# Patient Record
Sex: FEMALE | Race: WHITE | NOT HISPANIC OR LATINO | Employment: FULL TIME | URBAN - METROPOLITAN AREA
[De-identification: names, ages, dates, MRNs, and addresses within clinical notes are randomized per-mention and may not be internally consistent; named-entity substitution may affect disease eponyms.]

---

## 2019-01-21 ENCOUNTER — OFFICE VISIT (OUTPATIENT)
Dept: URGENT CARE | Facility: CLINIC | Age: 24
End: 2019-01-21

## 2019-01-21 VITALS
DIASTOLIC BLOOD PRESSURE: 67 MMHG | TEMPERATURE: 103 F | SYSTOLIC BLOOD PRESSURE: 116 MMHG | WEIGHT: 110 LBS | HEIGHT: 60 IN | HEART RATE: 126 BPM | BODY MASS INDEX: 21.6 KG/M2 | OXYGEN SATURATION: 99 %

## 2019-01-21 DIAGNOSIS — R50.9 FEVER, UNSPECIFIED FEVER CAUSE: ICD-10-CM

## 2019-01-21 DIAGNOSIS — J02.9 ACUTE PHARYNGITIS, UNSPECIFIED ETIOLOGY: Primary | ICD-10-CM

## 2019-01-21 LAB
CTP QC/QA: YES
S PYO RRNA THROAT QL PROBE: NEGATIVE

## 2019-01-21 PROCEDURE — 96372 THER/PROPH/DIAG INJ SC/IM: CPT | Mod: S$GLB,,, | Performed by: PHYSICIAN ASSISTANT

## 2019-01-21 PROCEDURE — 96372 PR INJECTION,THERAP/PROPH/DIAG2ST, IM OR SUBCUT: ICD-10-PCS | Mod: S$GLB,,, | Performed by: PHYSICIAN ASSISTANT

## 2019-01-21 PROCEDURE — 87880 STREP A ASSAY W/OPTIC: CPT | Mod: QW,S$GLB,, | Performed by: PHYSICIAN ASSISTANT

## 2019-01-21 PROCEDURE — 99204 OFFICE O/P NEW MOD 45 MIN: CPT | Mod: 25,S$GLB,, | Performed by: PHYSICIAN ASSISTANT

## 2019-01-21 PROCEDURE — 99204 PR OFFICE/OUTPT VISIT, NEW, LEVL IV, 45-59 MIN: ICD-10-PCS | Mod: 25,S$GLB,, | Performed by: PHYSICIAN ASSISTANT

## 2019-01-21 PROCEDURE — 87880 POCT RAPID STREP A: ICD-10-PCS | Mod: QW,S$GLB,, | Performed by: PHYSICIAN ASSISTANT

## 2019-01-21 RX ORDER — DEXAMETHASONE SODIUM PHOSPHATE 100 MG/10ML
8 INJECTION INTRAMUSCULAR; INTRAVENOUS
Status: COMPLETED | OUTPATIENT
Start: 2019-01-21 | End: 2019-01-21

## 2019-01-21 RX ORDER — AMOXICILLIN AND CLAVULANATE POTASSIUM 875; 125 MG/1; MG/1
1 TABLET, FILM COATED ORAL 2 TIMES DAILY
Qty: 20 TABLET | Refills: 0 | Status: SHIPPED | OUTPATIENT
Start: 2019-01-21 | End: 2019-01-31

## 2019-01-21 RX ORDER — TRIPROLIDINE/PSEUDOEPHEDRINE 2.5MG-60MG
600 TABLET ORAL
Status: COMPLETED | OUTPATIENT
Start: 2019-01-21 | End: 2019-01-21

## 2019-01-21 RX ADMIN — Medication 600 MG: at 01:01

## 2019-01-21 RX ADMIN — DEXAMETHASONE SODIUM PHOSPHATE 8 MG: 100 INJECTION INTRAMUSCULAR; INTRAVENOUS at 01:01

## 2019-01-21 NOTE — PROGRESS NOTES
Subjective:       Patient ID: Maury Smith is a 23 y.o. female.    Vitals:  height is 5' (1.524 m) and weight is 49.9 kg (110 lb). Her oral temperature is 102.6 °F (39.2 °C) (abnormal). Her blood pressure is 116/67 and her pulse is 126 (abnormal). Her oxygen saturation is 99%.     Chief Complaint: Sinus Problem    Sinus Problem   This is a new problem. The current episode started in the past 7 days (Saturday). The problem has been gradually worsening since onset. The maximum temperature recorded prior to her arrival was 102 - 102.9 F. The fever has been present for 1 to 2 days. Her pain is at a severity of 8/10. The pain is moderate. Associated symptoms include ear pain, a sore throat and swollen glands. Pertinent negatives include no chills, congestion, coughing, diaphoresis, headaches, neck pain, shortness of breath, sinus pressure or sneezing. Past treatments include nothing.       Constitution: Negative for chills, sweating, fatigue and fever.   HENT: Positive for ear pain and sore throat. Negative for congestion, sinus pain, sinus pressure and voice change.    Neck: Negative for neck pain and painful lymph nodes.   Eyes: Negative for eye redness.   Respiratory: Negative for chest tightness, cough, sputum production, bloody sputum, COPD, shortness of breath, stridor, wheezing and asthma.    Gastrointestinal: Negative for nausea and vomiting.   Musculoskeletal: Negative for muscle ache.   Skin: Negative for rash.   Allergic/Immunologic: Negative for seasonal allergies, asthma and sneezing.   Neurological: Negative for headaches.   Hematologic/Lymphatic: Negative for swollen lymph nodes.       Objective:      Physical Exam   Constitutional: She is oriented to person, place, and time. She appears well-developed and well-nourished. She is cooperative.  Non-toxic appearance. She does not have a sickly appearance. She does not appear ill. No distress.   HENT:   Head: Normocephalic and atraumatic.   Right Ear:  Hearing, tympanic membrane, external ear and ear canal normal.   Left Ear: Hearing, tympanic membrane, external ear and ear canal normal.   Nose: Nose normal. No mucosal edema, rhinorrhea or nasal deformity. No epistaxis. Right sinus exhibits no maxillary sinus tenderness and no frontal sinus tenderness. Left sinus exhibits no maxillary sinus tenderness and no frontal sinus tenderness.   Mouth/Throat: Uvula is midline and mucous membranes are normal. No trismus in the jaw. Normal dentition. No uvula swelling. Posterior oropharyngeal edema (mild) and posterior oropharyngeal erythema present. No oropharyngeal exudate. Tonsils are 1+ on the right. Tonsils are 1+ on the left. Tonsillar exudate.   Eyes: Conjunctivae, EOM and lids are normal. Pupils are equal, round, and reactive to light. No scleral icterus.   Sclera clear bilat   Neck: Trachea normal, full passive range of motion without pain and phonation normal. Neck supple. No neck rigidity. No edema and no erythema present.   Cardiovascular: Normal rate, regular rhythm, normal heart sounds, intact distal pulses and normal pulses.   Pulmonary/Chest: Effort normal and breath sounds normal. No respiratory distress. She has no decreased breath sounds. She has no wheezes. She has no rhonchi. She has no rales.   Abdominal: Soft. Normal appearance and bowel sounds are normal. She exhibits no distension. There is no tenderness.   Musculoskeletal: Normal range of motion. She exhibits no edema or deformity.   Lymphadenopathy:     She has no cervical adenopathy.   Neurological: She is alert and oriented to person, place, and time. She exhibits normal muscle tone. Coordination normal.   Skin: Skin is warm, dry and intact. She is not diaphoretic. No pallor.   Psychiatric: She has a normal mood and affect. Her speech is normal and behavior is normal. Judgment and thought content normal. Cognition and memory are normal.   Nursing note and vitals reviewed.      Assessment:       1.  Acute pharyngitis, unspecified etiology    2. Fever, unspecified fever cause        Plan:       - Pt declined respiratory culture for cost reasons. Will treat empirically based on history and physical exam findings. Warning signs and symptoms discussed that might warrant an ED visit. Pt v/u all education and instruction. Discharged in stable condition.    Acute pharyngitis, unspecified etiology  -     ibuprofen 100 mg/5 mL suspension 600 mg  -     dexamethasone injection 8 mg  -     amoxicillin-clavulanate 875-125mg (AUGMENTIN) 875-125 mg per tablet; Take 1 tablet by mouth 2 (two) times daily. for 10 days  Dispense: 20 tablet; Refill: 0    Fever, unspecified fever cause  -     Cancel: POCT Influenza A/B  -     POCT rapid strep A      Patient Instructions   · Follow up with your primary care in 2-5 days if symptoms have not improved, or you may return here.  · If you were referred to a specialist, please follow up with that specialty.  · If you were prescribed antibiotics, please take them to completion.  · If you were prescribed a narcotic or any medication with sedative effects, do not drive or operate heavy equipment or machinery while taking these medications.  · You must understand that you have received treatment at an Urgent Care facility only, and that you may be released before all of your medical problems are known or treated. Urgent Care facilities are not equipped to handle life threatening emergencies. It is recommended that you go to an Emergency Department for further evaluation of worsening or concerning symptoms, or possibly life threatening conditions as discussed.                                        If you  smoke, please stop smoking      Symptomatic treatment:    Alternate tylenol and motrin every 4-6 hours  salt water gargles  Cold-eeze helps to reduce the duration of sore throat symptoms  Cepachol helps to numb the discomfort  Chloroseptic spray  Nasal saline spray reduces inflammation and  dryness  Warm face compresses as often as you can  Vicks vapor rub at night  Flonase OTC or Nasacort OTC  Simple foods like chicken noodle soup help  Pedialyte helps with dehydration if lacking appetite  Rest as much as you can

## 2019-10-28 PROBLEM — F41.9 ANXIETY: Status: ACTIVE | Noted: 2019-10-28

## 2019-10-28 PROBLEM — E28.2 PCOS (POLYCYSTIC OVARIAN SYNDROME): Status: ACTIVE | Noted: 2019-10-28

## 2019-11-11 PROBLEM — R31.29 MICROSCOPIC HEMATURIA: Status: ACTIVE | Noted: 2019-11-11

## 2019-11-11 PROBLEM — R10.2 PERINEAL NEURALGIA: Status: ACTIVE | Noted: 2019-11-11

## 2019-11-12 PROBLEM — R10.2 PELVIC PAIN IN FEMALE: Status: ACTIVE | Noted: 2019-11-12

## 2020-11-19 PROBLEM — S00.83XA FACIAL CONTUSION, INITIAL ENCOUNTER: Status: ACTIVE | Noted: 2020-11-19

## 2020-11-19 PROBLEM — S00.33XA CONTUSION OF NOSE: Status: ACTIVE | Noted: 2020-11-19

## 2020-11-19 PROBLEM — F10.920 ALCOHOLIC INTOXICATION WITHOUT COMPLICATION: Status: ACTIVE | Noted: 2020-11-19

## 2020-11-19 PROBLEM — S01.81XA FACIAL LACERATION: Status: ACTIVE | Noted: 2020-11-19
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
## 2020-12-03 PROBLEM — G44.319 ACUTE POST-TRAUMATIC HEADACHE, NOT INTRACTABLE: Status: ACTIVE | Noted: 2020-12-03

## 2020-12-03 PROBLEM — F43.0 STRESS REACTION: Status: ACTIVE | Noted: 2020-12-03

## 2020-12-03 PROBLEM — R07.9 CHEST PAIN: Status: ACTIVE | Noted: 2020-12-03

## 2020-12-12 ENCOUNTER — OFFICE VISIT (OUTPATIENT)
Dept: URGENT CARE | Facility: CLINIC | Age: 25
End: 2020-12-12

## 2020-12-12 VITALS
DIASTOLIC BLOOD PRESSURE: 87 MMHG | OXYGEN SATURATION: 99 % | RESPIRATION RATE: 18 BRPM | WEIGHT: 134 LBS | BODY MASS INDEX: 26.17 KG/M2 | SYSTOLIC BLOOD PRESSURE: 123 MMHG | TEMPERATURE: 99 F | HEART RATE: 109 BPM

## 2020-12-12 DIAGNOSIS — U07.1 COVID-19 VIRUS DETECTED: ICD-10-CM

## 2020-12-12 DIAGNOSIS — U07.1 COVID-19 VIRUS INFECTION: ICD-10-CM

## 2020-12-12 DIAGNOSIS — Z11.59 SCREENING FOR VIRAL DISEASE: Primary | ICD-10-CM

## 2020-12-12 LAB
CTP QC/QA: YES
SARS-COV-2 RDRP RESP QL NAA+PROBE: POSITIVE

## 2020-12-12 PROCEDURE — U0002: ICD-10-PCS | Mod: QW,TIER,S$GLB, | Performed by: FAMILY MEDICINE

## 2020-12-12 PROCEDURE — U0002 COVID-19 LAB TEST NON-CDC: HCPCS | Mod: QW,TIER,S$GLB, | Performed by: FAMILY MEDICINE

## 2020-12-12 PROCEDURE — 99203 OFFICE O/P NEW LOW 30 MIN: CPT | Mod: TIER,S$GLB,, | Performed by: FAMILY MEDICINE

## 2020-12-12 PROCEDURE — 99203 PR OFFICE/OUTPT VISIT, NEW, LEVL III, 30-44 MIN: ICD-10-PCS | Mod: TIER,S$GLB,, | Performed by: FAMILY MEDICINE

## 2020-12-12 RX ORDER — NAPROXEN 375 MG/1
375 TABLET ORAL 2 TIMES DAILY
Qty: 20 TABLET | Refills: 0 | Status: SHIPPED | OUTPATIENT
Start: 2020-12-12 | End: 2021-02-04

## 2020-12-12 RX ORDER — DEXTROMETHORPHAN HBR, GUAIFENESIN AND PSEUDOEPHEDRINE HCL 60; 380; 20 MG/1; MG/1; MG/1
1 TABLET ORAL EVERY 6 HOURS PRN
Qty: 20 TABLET | Refills: 0 | Status: SHIPPED | OUTPATIENT
Start: 2020-12-12 | End: 2021-02-04

## 2020-12-12 NOTE — LETTER
[address] [facility] ? [place], [address] ? Phone [phone] ? Fax [phone]           Return to Work/School Status    Patient: Maury Diaz  YOB: 1995  Date: December 12, 2020      [facility] has adopted CDCs time-based return to work/school strategy for persons with confirmed or suspected COVID19. [facility] does not recommend using a test-based strategy for returning to work/school after COVID-19 infection. [facility] has reported prolonged positive test results without evidence of infectiousness. At this time, positive specimens capable of producing disease have not been isolated more than 9 days after onset of illness.   Symptomatic persons with confirmed COVID-19 or suspected COVID-19 can return to work/school after:    At least 3 days (72 hours) have passed since recovery defined as resolution of fever without the use of fever-reducing medications AND improvement in respiratory symptoms (e.g., cough, shortness of breath)    AND, at least 10 days have passed since symptoms first develop.  Asymptomatic persons with confirmed COVID-19 can return to work after:   At least 10 days have passed since the positive laboratory test and the person remains asymptomatic.  More information about the science behind the symptom-based return to work/school can be found at: https://www.cdc.gov/coronavirus/2019-ncov/community/vtxttaqg-mpbwwavpkuu-kbmcousyt.html    Sincerely,    Sterling Fragoso MD

## 2020-12-12 NOTE — PROGRESS NOTES
Subjective:       Patient ID: Maury Diaz is a 25 y.o. female.    Vitals:  weight is 60.8 kg (134 lb). Her temperature is 99.4 °F (37.4 °C). Her blood pressure is 123/87 and her pulse is 109. Her respiration is 18 and oxygen saturation is 99%.     Chief Complaint: Sore Throat    Sore Throat   This is a new problem. The current episode started today. The problem has been unchanged. The maximum temperature recorded prior to her arrival was 101 - 101.9 F. Associated symptoms include headaches and trouble swallowing. Pertinent negatives include no congestion, coughing, diarrhea, ear pain, neck pain, shortness of breath or vomiting. Treatments tried: motrin. The treatment provided mild relief.       Constitution: Positive for fever. Negative for chills, sweating and fatigue.   HENT: Positive for sore throat and trouble swallowing. Negative for ear pain, congestion, sinus pain, sinus pressure and voice change.    Neck: Negative for neck pain and painful lymph nodes.   Eyes: Negative for eye redness.   Respiratory: Negative for chest tightness, cough, sputum production, shortness of breath and asthma.    Gastrointestinal: Negative for nausea, vomiting and diarrhea.   Musculoskeletal: Positive for muscle ache.   Skin: Negative for rash.   Allergic/Immunologic: Negative for seasonal allergies and asthma.   Neurological: Positive for headaches.   Hematologic/Lymphatic: Negative for swollen lymph nodes.       Objective:      Physical Exam   Constitutional: She is oriented to person, place, and time. She appears well-developed. She is cooperative.  Non-toxic appearance. She does not appear ill. No distress.   HENT:   Head: Normocephalic and atraumatic.   Ears:   Right Ear: Hearing, tympanic membrane, external ear and ear canal normal.   Left Ear: Hearing, tympanic membrane, external ear and ear canal normal.   Nose: Nose normal. No mucosal edema, rhinorrhea or nasal deformity. No epistaxis. Right sinus exhibits no  maxillary sinus tenderness and no frontal sinus tenderness. Left sinus exhibits no maxillary sinus tenderness and no frontal sinus tenderness.   Mouth/Throat: Uvula is midline, oropharynx is clear and moist and mucous membranes are normal. No trismus in the jaw. Normal dentition. No uvula swelling. No oropharyngeal exudate, posterior oropharyngeal edema or posterior oropharyngeal erythema.   Eyes: Conjunctivae and lids are normal. No scleral icterus.   Neck: Trachea normal, full passive range of motion without pain and phonation normal. Neck supple. No neck rigidity. No edema and no erythema present.   Cardiovascular: Normal rate, regular rhythm, normal heart sounds and normal pulses.   Pulmonary/Chest: Effort normal and breath sounds normal. No respiratory distress. She has no decreased breath sounds. She has no rhonchi.   Abdominal: Normal appearance.   Musculoskeletal: Normal range of motion.         General: No deformity.   Neurological: She is alert and oriented to person, place, and time. She exhibits normal muscle tone. Coordination normal.   Skin: Skin is warm, dry, intact, not diaphoretic and not pale. Psychiatric: Her speech is normal and behavior is normal. Judgment and thought content normal.   Nursing note and vitals reviewed.    Results for orders placed or performed in visit on 12/12/20   POCT COVID-19 Rapid Screening   Result Value Ref Range    POC Rapid COVID Positive (A) Negative     Acceptable Yes            Assessment:       1. Screening for viral disease    2. COVID-19 virus infection        Plan:         Screening for viral disease  -     POCT COVID-19 Rapid Screening    COVID-19 virus infection  -     pseudoephedrine-DM-guaifenesin (POLY-VENT DM) 60- mg Tab; Take 1 tablet by mouth every 6 (six) hours as needed.  Dispense: 20 tablet; Refill: 0  -     naproxen (NAPROSYN) 375 MG tablet; Take 1 tablet (375 mg total) by mouth 2 (two) times daily.  Dispense: 20 tablet; Refill:  0     Please drink plenty of fluids.  Please get plenty of rest.  Please return here or go to the Emergency Department for any concerns or worsening of condition.    You have tested positive for COVID-19 today.  Please note that patients who test positive for COVID-19 are required by the CDC to undergo isolation for 10 days after their symptoms first began.  This isolation starts from the day you first developed symptoms, not the day of your positive test. For example, if your symptoms began on a Monday but tested positive on the following Wednesday, your 10-day isolation begins from that Monday, not the Wednesday you tested positive.  However, if you are asymptomatic (a person who does not have any symptoms) and COVID-19 positive, your 10-day isolation begins on the day you tested positive, regardless of exposure date.  Also, per the CDC guidelines, once your 10 days have passed, and you have not had fever greater than 100.4F in the last 24 hours without taking any fever reducers such as Tylenol (Acetaminophen) or Motrin (Ibuprofen), you may return to your normal activities including social distancing, wearing masks, and frequent handwashing - YOU DO NOT NEED ANOTHER TEST IN ORDER TO END YOUR QUARANTINE.     If you were prescribed a narcotic medication, do not drive or operate heavy equipment or machinery while taking these medications.    You were given a decongestant (RESCON or POLY VENT Dm).  If your insurance does not cover it or you cannot afford it, it is ok to use the over the counter products listed below.  If you do not have Hypertension or any history of palpitations, it is ok to take over the counter Sudafed or Mucinex D or Allegra-D or Claritin-D or Zyrtec-D.  If you do take one of the above, it is ok to combine that with plain over the counter Mucinex or Allegra or Claritin or Zyrtec.  If for example you are taking Zyrtec -D, you can combine that with Mucinex, but not Mucinex-D.  If you are taking  Mucinex-D, you can combine that with plain Allegra or Claritin or Zyrtec.   If you do have Hypertension or palpitations, it is safe to take Coricidin HBP for relief of sinus symptoms.    We recommend you take over the counter Flonase (Fluticasone) or another nasally inhaled steroid unless you are already taking one.  Nasal irrigation with a saline spray or Netti Pot like device per their directions is also recommended.  If not allergic, please take over the counter Tylenol (Acetaminophen) and/or Motrin (Ibuprofen) as directed for control of pain and/or fever.    We recommend Vitamin C (1000mg daily), Vitamin d3 (125mg daily), and Zinc (100mg daily) to help combat the Coronavirus.    Robitussin DM 2 teas every 4 hours as needed for cough.  If you  smoke, please stop smoking.    Please follow up with your primary care doctor or specialist as needed.  Nicolas Kenny MD  [phone]      You must understand that you have received treatment at an Urgent Care facility only, and that you may be  released before all of your medical problems are known or treated. Urgent Care facilities are not equipped to  handle life threatening emergencies. It is recommended that you seek care at an Emergency Department for  further evaluation of worsening or concerning symptoms, or possibly life threatening conditions as  Discussed.

## 2020-12-12 NOTE — PATIENT INSTRUCTIONS
Please drink plenty of fluids.  Please get plenty of rest.  Please return here or go to the Emergency Department for any concerns or worsening of condition.    You have tested positive for COVID-19 today.  Please note that patients who test positive for COVID-19 are required by the CDC to undergo isolation for 10 days after their symptoms first began.  This isolation starts from the day you first developed symptoms, not the day of your positive test. For example, if your symptoms began on a Monday but tested positive on the following Wednesday, your 10-day isolation begins from that Monday, not the Wednesday you tested positive.  However, if you are asymptomatic (a person who does not have any symptoms) and COVID-19 positive, your 10-day isolation begins on the day you tested positive, regardless of exposure date.  Also, per the CDC guidelines, once your 10 days have passed, and you have not had fever greater than 100.4F in the last 24 hours without taking any fever reducers such as Tylenol (Acetaminophen) or Motrin (Ibuprofen), you may return to your normal activities including social distancing, wearing masks, and frequent handwashing - YOU DO NOT NEED ANOTHER TEST IN ORDER TO END YOUR QUARANTINE.     If you were prescribed a narcotic medication, do not drive or operate heavy equipment or machinery while taking these medications.    You were given a decongestant (RESCON or POLY VENT Dm).  If your insurance does not cover it or you cannot afford it, it is ok to use the over the counter products listed below.  If you do not have Hypertension or any history of palpitations, it is ok to take over the counter Sudafed or Mucinex D or Allegra-D or Claritin-D or Zyrtec-D.  If you do take one of the above, it is ok to combine that with plain over the counter Mucinex or Allegra or Claritin or Zyrtec.  If for example you are taking Zyrtec -D, you can combine that with Mucinex, but not Mucinex-D.  If you are taking Mucinex-D,  you can combine that with plain Allegra or Claritin or Zyrtec.   If you do have Hypertension or palpitations, it is safe to take Coricidin HBP for relief of sinus symptoms.    We recommend you take over the counter Flonase (Fluticasone) or another nasally inhaled steroid unless you are already taking one.  Nasal irrigation with a saline spray or Netti Pot like device per their directions is also recommended.  If not allergic, please take over the counter Tylenol (Acetaminophen) and/or Motrin (Ibuprofen) as directed for control of pain and/or fever.    We recommend Vitamin C (1000mg daily), Vitamin d3 (125mg daily), and Zinc (100mg daily) to help combat the Coronavirus.    Robitussin DM 2 teas every 4 hours as needed for cough.  If you  smoke, please stop smoking.    Please follow up with your primary care doctor or specialist as needed.  Nicolas Kenny MD  [phone]      You must understand that you have received treatment at an Urgent Care facility only, and that you may be  released before all of your medical problems are known or treated. Urgent Care facilities are not equipped to  handle life threatening emergencies. It is recommended that you seek care at an Emergency Department for  further evaluation of worsening or concerning symptoms, or possibly life threatening conditions as  Discussed.    Instructions for Patients with Confirmed or Suspected COVID-19    If you are awaiting your test result, you will either be called or it will be released to the patient portal.  If you have any questions about your test, please visit [email or web address] or call our COVID-19 information line at [phone].      Instructions for non-hospitalized or discharged patients with confirmed or suspected COVID-19:       Stay home except to get medical care.    Separate yourself from other people and animals in your home.    Call ahead before visiting your doctor.    Wear a face mask.    Cover your coughs and  sneezes.    Clean your hands often.    Avoid sharing personal household items.    Clean all high-touch surfaces every day.    Monitor your symptoms. Seek prompt medical attention if your illness is worsening (e.g., difficulty breathing). Before seeking care, call your healthcare provider.    If you have a medical emergency and must call 911, notify the dispatcher that you have or are being evaluated for COVID-19. If possible, put on a face mask before emergency medical services arrive.    Use the following symptom-based strategy to return to normal activity following a suspected or confirmed case of COVID-19. Continue isolation until:   o At least 3 days (72 hours) have passed since recovery defined as resolution of fever without the use of fever-reducing medications and improvement in respiratory symptoms (e.g. cough, shortness of breath), and   o At least 10 days have passed since the first positive test.       As one of the next steps, you will receive a call or text from the [place] Department of Health ([facility]) COVID-19 Tracing Team. See the contact information below so you know not to ignore the health departments call. It is important that you contact them back immediately so they can help.     Contact Tracer Number:  [phone]  Caller ID for most carriers: [facility]    What is contact tracing?   Contact tracing is a process that helps identify everyone who has been in close contact with an infected person. Contact tracers let those people know they may have been exposed and guide them on next steps. Confidentiality is important for everyone; no one will be told who may have exposed them to the virus.   Your involvement is important. The more we know about where and how this virus is spreading, the better chance we have at stopping it from spreading further.  What does exposure mean?   Exposure means you have been within 6 feet for more than 15 minutes with a person who has or had  COVID-19.  What kind of questions do the contact tracers ask?   A contact tracer will confirm your basic contact information including name, address, phone number, and next of kin, as well as asking about any symptoms you may have had. Theyll also ask you how you think you may have gotten sick, such as places where you may have been exposed to the virus, and people you were with. Those names will never be shared with anyone outside of that call, and will only be used to help trace and stop the spread of the virus.   I have privacy concerns. How will the state use my information?   Your privacy about your health is important. All calls are completed using call centers that use the appropriate health privacy protection measures (HIPAA compliance), meaning that your patient information is safe. No one will ever ask you any questions related to immigration status. Your health comes first.   Do I have to participate?   You do not have to participate, but we strongly encourage you to. Contact tracing can help us catch and control new outbreaks as theyre developing to keep your friends and family safe.   What if I dont hear from anyone?   If you dont receive a call within 24 hours, you can call the number above right away to inquire about your status. That line is open from 8:00 am - 8:00 p.m., 7 days a week.  Contact tracing saves lives! Together, we have the power to beat this virus and keep our loved ones and neighbors safe.       Instructions for household members, intimate partners and caregivers in a non-healthcare setting of a patient with confirmed or suspected COVID-19:         Close contacts should monitor their health and call their healthcare provider right away if they develop symptoms suggestive of COVID-19 (e.g., fever, cough, shortness of breath).    Stay home except to get medical care. Separate yourself from other people and animals in the home.   Monitor the patients symptoms. If the patient  is getting sicker, call his or her healthcare provider. If the patient has a medical emergency and you need to call 911, notify the dispatch personnel that the patient has or is being evaluated for COVID-19.    Wear a facemask when around other people such as sharing a room or vehicle and before entering a healthcare provider's office.   Cover coughs and sneezes with a tissue. Throw used tissues in a lined trash can immediately and wash hands.   Clean hands often with soap and water for at least 20 seconds or with an alcohol-based hand , rubbing hands together until they feel dry. Avoid touching your eyes, nose, and mouth with unwashed hands.   Clean all high-touch; surfaces every day, including counters, tabletops, doorknobs, bathroom fixtures, toilets, phones, keyboards, tablets, bedside tables, etc. Use a household cleaning spray or wipe according to label instructions.   Avoid sharing personal household items such as dishes, drinking glasses, cups, towels, bedding, etc. After these items are used, they should be washed thoroughly with soap and water.   Continue isolation until:   At least 3 days (72 hours) have passed since recovery defined as resolution of fever without the use of fever-reducing medications and improvement in respiratory symptoms (e.g. cough, shortness of breath), and    At least 10 days have passed since the patients first positive test.    https://www.cdc.gov/coronavirus/2019-ncov/your-health/index.htm

## 2020-12-15 ENCOUNTER — NURSE TRIAGE (OUTPATIENT)
Dept: ADMINISTRATIVE | Facility: CLINIC | Age: 25
End: 2020-12-15

## 2020-12-15 NOTE — TELEPHONE ENCOUNTER
Day 3   Discussed cough med and mucinex and  advil and tylenol   Report any SOB, cough     Reason for Disposition   [1] COVID-19 diagnosed by HCP (doctor, NP or PA) AND [2] mild symptoms (e.g., cough, fever, others) AND [3] no complications or SOB    Additional Information   Negative: Severe difficulty breathing (e.g., struggling for each breath, speaks in single words)   Negative: Difficult to awaken or acting confused (e.g., disoriented, slurred speech)   Negative: Bluish (or gray) lips or face now   Negative: Shock suspected (e.g., cold/pale/clammy skin, too weak to stand, low BP, rapid pulse)   Negative: Sounds like a life-threatening emergency to the triager   Negative: [1] COVID-19 exposure AND [2] no symptoms   Negative: COVID-19 and Breastfeeding, questions about   Negative: [1] Adult with possible COVID-19 symptoms AND [2] triager concerned about severity of symptoms or other causes   Negative: SEVERE or constant chest pain or pressure (Exception: mild central chest pain, present only when coughing)   Negative: MODERATE difficulty breathing (e.g., speaks in phrases, SOB even at rest, pulse 100-120)   Negative: MILD difficulty breathing (e.g., minimal/no SOB at rest, SOB with walking, pulse <100)   Negative: Chest pain   Negative: Patient sounds very sick or weak to the triager   Negative: Fever > 103 F (39.4 C)   Negative: [1] Fever > 101 F (38.3 C) AND [2] age > 60   Negative: [1] Fever > 100.0 F (37.8 C) AND [2] bedridden (e.g., nursing home patient, CVA, chronic illness, recovering from surgery)   Negative: HIGH RISK patient (e.g., age > 64 years, diabetes, heart or lung disease, weak immune system) (Exception: has already been evaluated by healthcare provider and has no new or worsening symptoms)   Negative: [1] COVID-19 infection suspected by caller or triager AND [2] mild symptoms (cough, fever, or others) AND [3] no complications or SOB   Negative: Fever present > 3 days (72  hours)   Negative: [1] Fever returns after gone for over 24 hours AND [2] symptoms worse or not improved   Negative: [1] Continuous (nonstop) coughing interferes with work or school AND [2] no improvement using cough treatment per protocol   Negative: Cough present > 3 weeks    Protocols used: CORONAVIRUS (COVID-19) DIAGNOSED OR [id number]

## 2021-04-27 ENCOUNTER — HOSPITAL ENCOUNTER (EMERGENCY)
Facility: HOSPITAL | Age: 26
Discharge: HOME | End: 2021-04-28
Attending: EMERGENCY MEDICINE
Payer: COMMERCIAL
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
## 2021-04-27 LAB
B-HCG UR QL: NEGATIVE
BASOPHILS # BLD AUTO: 0.03 K/UL (ref 0–0.2)
BASOPHILS NFR BLD: 0.6 % (ref 0–1.9)
BUN SERPL-MCNC: 16 MG/DL (ref 6–30)
CHLORIDE SERPL-SCNC: 100 MMOL/L (ref 95–110)
CREAT SERPL-MCNC: 0.7 MG/DL (ref 0.5–1.4)
CTP QC/QA: YES
DIFFERENTIAL METHOD: ABNORMAL
EOSINOPHIL # BLD AUTO: 0.1 K/UL (ref 0–0.5)
EOSINOPHIL NFR BLD: 2 % (ref 0–8)
ERYTHROCYTE [DISTWIDTH] IN BLOOD BY AUTOMATED COUNT: 13.7 % (ref 11.5–14.5)
GLUCOSE SERPL-MCNC: 87 MG/DL (ref 70–110)
HCT VFR BLD AUTO: 36.5 % (ref 37–48.5)
HCT VFR BLD CALC: 40 %PCV (ref 36–54)
HGB BLD-MCNC: 12 G/DL (ref 12–16)
IMM GRANULOCYTES # BLD AUTO: 0.01 K/UL (ref 0–0.04)
IMM GRANULOCYTES NFR BLD AUTO: 0.2 % (ref 0–0.5)
LYMPHOCYTES # BLD AUTO: 1.2 K/UL (ref 1–4.8)
LYMPHOCYTES NFR BLD: 25.3 % (ref 18–48)
MCH RBC QN AUTO: 30.1 PG (ref 27–31)
MCHC RBC AUTO-ENTMCNC: 32.9 G/DL (ref 32–36)
MCV RBC AUTO: 92 FL (ref 82–98)
MONOCYTES # BLD AUTO: 0.5 K/UL (ref 0.3–1)
MONOCYTES NFR BLD: 9.2 % (ref 4–15)
NEUTROPHILS # BLD AUTO: 3.1 K/UL (ref 1.8–7.7)
NEUTROPHILS NFR BLD: 62.7 % (ref 38–73)
NRBC BLD-RTO: 0 /100 WBC
PLATELET # BLD AUTO: 256 K/UL (ref 150–450)
PMV BLD AUTO: 9.3 FL (ref 9.2–12.9)
POC IONIZED CALCIUM: 1.33 MMOL/L (ref 1.06–1.42)
POC TCO2 (MEASURED): 29 MMOL/L (ref 23–29)
POTASSIUM BLD-SCNC: 3.9 MMOL/L (ref 3.5–5.1)
RBC # BLD AUTO: 3.99 M/UL (ref 4–5.4)
SAMPLE: NORMAL
SODIUM BLD-SCNC: 139 MMOL/L (ref 136–145)
WBC # BLD AUTO: 4.9 K/UL (ref 3.9–12.7)

## 2021-04-27 PROCEDURE — 85025 COMPLETE CBC W/AUTO DIFF WBC: CPT | Performed by: STUDENT IN AN ORGANIZED HEALTH CARE EDUCATION/TRAINING PROGRAM

## 2021-04-27 PROCEDURE — 99291 CRITICAL CARE FIRST HOUR: CPT | Mod: ,,, | Performed by: EMERGENCY MEDICINE

## 2021-04-27 PROCEDURE — 99285 EMERGENCY DEPT VISIT HI MDM: CPT | Mod: 25

## 2021-04-27 PROCEDURE — 81025 URINE PREGNANCY TEST: CPT | Performed by: STUDENT IN AN ORGANIZED HEALTH CARE EDUCATION/TRAINING PROGRAM

## 2021-04-27 PROCEDURE — 80047 BASIC METABLC PNL IONIZED CA: CPT

## 2021-04-27 PROCEDURE — 96374 THER/PROPH/DIAG INJ IV PUSH: CPT

## 2021-04-27 PROCEDURE — 99291 PR CRITICAL CARE, E/M 30-74 MINUTES: ICD-10-PCS | Mod: ,,, | Performed by: EMERGENCY MEDICINE

## 2021-04-27 RX ORDER — CYCLOBENZAPRINE HCL 10 MG
10 TABLET ORAL
Status: COMPLETED | OUTPATIENT
Start: 2021-04-27 | End: 2021-04-28

## 2021-04-27 RX ORDER — ACETAMINOPHEN 500 MG
1000 TABLET ORAL
Status: COMPLETED | OUTPATIENT
Start: 2021-04-27 | End: 2021-04-28

## 2021-04-28 VITALS
RESPIRATION RATE: 33 BRPM | HEART RATE: 84 BPM | SYSTOLIC BLOOD PRESSURE: 111 MMHG | TEMPERATURE: 99 F | DIASTOLIC BLOOD PRESSURE: 71 MMHG | OXYGEN SATURATION: 100 %

## 2021-04-28 PROCEDURE — 25500020 PHARM REV CODE 255: Performed by: EMERGENCY MEDICINE

## 2021-04-28 PROCEDURE — 25000003 PHARM REV CODE 250: Performed by: STUDENT IN AN ORGANIZED HEALTH CARE EDUCATION/TRAINING PROGRAM

## 2021-04-28 PROCEDURE — 63600175 PHARM REV CODE 636 W HCPCS: Performed by: STUDENT IN AN ORGANIZED HEALTH CARE EDUCATION/TRAINING PROGRAM

## 2021-04-28 RX ORDER — DIPHENHYDRAMINE HYDROCHLORIDE 50 MG/ML
25 INJECTION INTRAMUSCULAR; INTRAVENOUS ONCE
Status: COMPLETED | OUTPATIENT
Start: 2021-04-28 | End: 2021-04-28

## 2021-04-28 RX ORDER — CYCLOBENZAPRINE HCL 10 MG
10 TABLET ORAL 3 TIMES DAILY PRN
Qty: 15 TABLET | Refills: 0 | Status: SHIPPED | OUTPATIENT
Start: 2021-04-28 | End: 2021-04-28 | Stop reason: SDUPTHER

## 2021-04-28 RX ORDER — CYCLOBENZAPRINE HCL 10 MG
10 TABLET ORAL 3 TIMES DAILY PRN
Qty: 15 TABLET | Refills: 0 | Status: SHIPPED | OUTPATIENT
Start: 2021-04-28 | End: 2021-04-29 | Stop reason: ALTCHOICE

## 2021-04-28 RX ADMIN — ACETAMINOPHEN 1000 MG: 500 TABLET, FILM COATED ORAL at 12:04

## 2021-04-28 RX ADMIN — IOHEXOL 75 ML: 350 INJECTION, SOLUTION INTRAVENOUS at 12:04

## 2021-04-28 RX ADMIN — CYCLOBENZAPRINE 10 MG: 10 TABLET, FILM COATED ORAL at 12:04

## 2021-04-28 RX ADMIN — DIPHENHYDRAMINE HYDROCHLORIDE 25 MG: 50 INJECTION, SOLUTION INTRAMUSCULAR; INTRAVENOUS at 12:04

## 2021-04-29 ENCOUNTER — TELEPHONE (OUTPATIENT)
Dept: EMERGENCY MEDICINE | Facility: HOSPITAL | Age: 26
End: 2021-04-29

## 2021-05-10 ENCOUNTER — PATIENT MESSAGE (OUTPATIENT)
Dept: RESEARCH | Facility: HOSPITAL | Age: 26
End: 2021-05-10

## 2022-02-05 ENCOUNTER — OFFICE VISIT (OUTPATIENT)
Dept: URGENT CARE | Facility: CLINIC | Age: 27
End: 2022-02-05
Payer: COMMERCIAL

## 2022-02-05 VITALS
BODY MASS INDEX: 27.48 KG/M2 | RESPIRATION RATE: 18 BRPM | HEART RATE: 81 BPM | OXYGEN SATURATION: 98 % | WEIGHT: 140 LBS | TEMPERATURE: 99 F | SYSTOLIC BLOOD PRESSURE: 123 MMHG | HEIGHT: 60 IN | DIASTOLIC BLOOD PRESSURE: 87 MMHG

## 2022-02-05 DIAGNOSIS — J30.9 ALLERGIC SHINERS: ICD-10-CM

## 2022-02-05 DIAGNOSIS — J30.89 NON-SEASONAL ALLERGIC RHINITIS, UNSPECIFIED TRIGGER: Primary | ICD-10-CM

## 2022-02-05 PROCEDURE — 3079F DIAST BP 80-89 MM HG: CPT | Mod: CPTII,S$GLB,, | Performed by: NURSE PRACTITIONER

## 2022-02-05 PROCEDURE — 3079F PR MOST RECENT DIASTOLIC BLOOD PRESSURE 80-89 MM HG: ICD-10-PCS | Mod: CPTII,S$GLB,, | Performed by: NURSE PRACTITIONER

## 2022-02-05 PROCEDURE — 99203 PR OFFICE/OUTPT VISIT, NEW, LEVL III, 30-44 MIN: ICD-10-PCS | Mod: S$GLB,,, | Performed by: NURSE PRACTITIONER

## 2022-02-05 PROCEDURE — 1159F MED LIST DOCD IN RCRD: CPT | Mod: CPTII,S$GLB,, | Performed by: NURSE PRACTITIONER

## 2022-02-05 PROCEDURE — 1160F RVW MEDS BY RX/DR IN RCRD: CPT | Mod: CPTII,S$GLB,, | Performed by: NURSE PRACTITIONER

## 2022-02-05 PROCEDURE — 3008F BODY MASS INDEX DOCD: CPT | Mod: CPTII,S$GLB,, | Performed by: NURSE PRACTITIONER

## 2022-02-05 PROCEDURE — 3074F PR MOST RECENT SYSTOLIC BLOOD PRESSURE < 130 MM HG: ICD-10-PCS | Mod: CPTII,S$GLB,, | Performed by: NURSE PRACTITIONER

## 2022-02-05 PROCEDURE — 99203 OFFICE O/P NEW LOW 30 MIN: CPT | Mod: S$GLB,,, | Performed by: NURSE PRACTITIONER

## 2022-02-05 PROCEDURE — 3008F PR BODY MASS INDEX (BMI) DOCUMENTED: ICD-10-PCS | Mod: CPTII,S$GLB,, | Performed by: NURSE PRACTITIONER

## 2022-02-05 PROCEDURE — 3074F SYST BP LT 130 MM HG: CPT | Mod: CPTII,S$GLB,, | Performed by: NURSE PRACTITIONER

## 2022-02-05 PROCEDURE — 1159F PR MEDICATION LIST DOCUMENTED IN MEDICAL RECORD: ICD-10-PCS | Mod: CPTII,S$GLB,, | Performed by: NURSE PRACTITIONER

## 2022-02-05 PROCEDURE — 1160F PR REVIEW ALL MEDS BY PRESCRIBER/CLIN PHARMACIST DOCUMENTED: ICD-10-PCS | Mod: CPTII,S$GLB,, | Performed by: NURSE PRACTITIONER

## 2022-02-05 RX ORDER — LEVOCETIRIZINE DIHYDROCHLORIDE 5 MG/1
5 TABLET, FILM COATED ORAL NIGHTLY
Qty: 30 TABLET | Refills: 0 | Status: SHIPPED | OUTPATIENT
Start: 2022-02-05 | End: 2022-05-24

## 2022-02-05 RX ORDER — IPRATROPIUM BROMIDE 21 UG/1
2 SPRAY, METERED NASAL 2 TIMES DAILY
Qty: 30 ML | Refills: 0 | Status: SHIPPED | OUTPATIENT
Start: 2022-02-05 | End: 2022-02-19

## 2022-02-05 NOTE — PROGRESS NOTES
Subjective:       Patient ID: Maury Smith is a 26 y.o. female.    Vitals:  height is 5' (1.524 m) and weight is 63.5 kg (140 lb). Her tympanic temperature is 98.5 °F (36.9 °C). Her blood pressure is 123/87 and her pulse is 81. Her respiration is 18 and oxygen saturation is 98%.     Chief Complaint: Eye Problem and Nasal Congestion (Symptoms has been on and off for 2 months)    Patient comes to the clinic today with complaint in sinus congestion, intermittent productive cough, itchy watery eyes x2 months.    She denies history of recurrent seasonal allergies prior to current onset.  She denies any recent environmental changes that may have prompted an allergic response.  She did recently move apartments but states her symptoms began prior to removed.  Patient denies any soap or detergent changes.  No new pets or other exposure that she is aware of.    Patient denies recent fever though she admits that perhaps 2 months ago she may have felt warm 1 day.    Patient has taken intermittent doses of phenylephrine with limited or no relief.  She has not taken any antihistamines or other allergy medicines.    Eye Problem   Both eyes are affected.This is a recurrent problem. The current episode started more than 1 month ago. The problem occurs intermittently. The problem has been gradually worsening. There was no injury mechanism. The pain is at a severity of 0/10. The patient is experiencing no pain. There is no known exposure to pink eye. She does not wear contacts. Associated symptoms include eye redness and itching. Pertinent negatives include no blurred vision, double vision, fever, nausea or vomiting. She has tried nothing for the symptoms. The treatment provided no relief.       Constitution: Negative for chills and fever.   HENT: Positive for congestion and postnasal drip. Negative for ear pain.    Eyes: Positive for eye itching and eye redness. Negative for vision loss, double vision, blurred vision and eyelid  swelling.   Respiratory: Positive for cough. Negative for shortness of breath.    Gastrointestinal: Negative for nausea, vomiting and diarrhea.       Objective:      Physical Exam   Constitutional: She is oriented to person, place, and time. She appears well-developed and well-nourished. She is cooperative.  Non-toxic appearance. She does not have a sickly appearance. She does not appear ill. No distress.   HENT:   Head: Normocephalic and atraumatic.   Ears:   Right Ear: Hearing, tympanic membrane, external ear and ear canal normal.   Left Ear: Hearing, tympanic membrane, external ear and ear canal normal.   Nose: Mucosal edema and rhinorrhea present. No nasal deformity. No epistaxis. Right sinus exhibits no maxillary sinus tenderness and no frontal sinus tenderness. Left sinus exhibits no maxillary sinus tenderness and no frontal sinus tenderness.   Mouth/Throat: Uvula is midline, oropharynx is clear and moist and mucous membranes are normal. No trismus in the jaw. Normal dentition. No uvula swelling. Cobblestoning present. No oropharyngeal exudate, posterior oropharyngeal edema or posterior oropharyngeal erythema.   Eyes: Conjunctivae and lids are normal. No scleral icterus.   vision grossly intact periorbital hyperpigmentation   Neck: Trachea normal and phonation normal. Neck supple. No edema present. No erythema present. No neck rigidity present.   Cardiovascular: Normal rate, regular rhythm, normal heart sounds, intact distal pulses and normal pulses.   Pulmonary/Chest: Effort normal and breath sounds normal. No stridor. No respiratory distress. She has no decreased breath sounds. She has no wheezes. She has no rhonchi. She has no rales.   Abdominal: Normal appearance.   Musculoskeletal: Normal range of motion.         General: No deformity or edema. Normal range of motion.   Neurological: She is alert and oriented to person, place, and time. She exhibits normal muscle tone. Coordination normal.   Skin: Skin is  warm, dry, intact, not diaphoretic and not pale.   Psychiatric: She has a normal mood and affect. Her speech is normal and behavior is normal. Judgment and thought content normal. Cognition and memory  Nursing note and vitals reviewed.        Assessment:       1. Non-seasonal allergic rhinitis, unspecified trigger    2. 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  Prescribed liver cetirizine in office today as it is the only oral antihistamine listed but does not have iodine as an inert ingredients.  Patient cautioned that she should take this product in the evening as it may make her drowsy.    Non-seasonal allergic rhinitis, unspecified trigger  -     ipratropium (ATROVENT) 21 mcg (0.03 %) nasal spray; 2 sprays by Nasal route 2 (two) times daily. for 14 days  Dispense: 30 mL; Refill: 0  -     levocetirizine (XYZAL) 5 MG tablet; Take 1 tablet (5 mg total) by mouth every evening.  Dispense: 30 tablet; Refill: 0    Allergic shiners  -     ipratropium (ATROVENT) 21 mcg (0.03 %) nasal spray; 2 sprays by Nasal route 2 (two) times daily. for 14 days  Dispense: 30 mL; Refill: 0  -     levocetirizine (XYZAL) 5 MG tablet; Take 1 tablet (5 mg total) by mouth every evening.  Dispense: 30 tablet; Refill: 0      Patient Instructions   Patient Education       Seasonal Allergies Discharge Instructions   About this topic   Seasonal allergies are also called allergic rhinitis or hay fever. You may have sneezing; a stuffy or runny nose; or itchy throat, ears, or eyes. You may feel very tired. Most often, this problem is caused by:  · Pollens from trees, grasses, or weeds.  · Mold spores that grow when the air is humid, wet, or damp.  Some people can breathe in these things and have no problems. But when you have a seasonal allergy, your body acts as if the substance is harming you. Then you have symptoms. You may have symptoms only at some times of the year.  If your symptoms last all year, they may be caused by:  · Insects, such as dust mites or cock roaches.  · Animals, such as cats or dogs.  · Mold spores.     What care is needed at home?   · Ask your doctor what you need to do when you go home. Make sure you ask questions if you do not understand what the doctor says.  · Rinse your nose with salt water to get rid of pollen inside of your nose.  · Keep the windows closed and use air conditioning.  · Shower before bed to rinse pollen from your hair and skin.  · Wear a dust mask if you need to be outside.  · Use over-the-counter medicines to help with your symptoms:  ? A steroid nose spray can help with a stuffy nose. It can also help with drainage down the back of your throat.  ? An antihistamine can help with itching, sneezing, or runny nose.  ? An antihistamine eye drop can help with itchy eyes.  ? A decongestant can help with a stuffy nose. Talk with your doctor or pharmacist about your other health problems before you use this kind of medicine. It may not be safe for people with high blood pressure.  What follow-up care is needed?   Your doctor may ask you to make visits to the office to check on your progress. Your doctor may ask you to see an allergy specialist, or to have testing done to learn what is causing your allergies. Be sure to keep these visits.  What drugs may be needed?   The doctor may order drugs to treat the signs. Take your drugs as ordered. You may also take allergy shots if you have had allergy tests.  Will physical activity be limited?   You may have to limit your activity. If your health problem is caused by an allergy to pollens or molds, you may want to limit your time outdoors. Talk to your doctor about what activities are best for you.  What problems could happen?   · Your signs may not get better with your drugs  · Asthma may get worse  · Sinus infection  What can be done to prevent this health problem?   Try to avoid allergens.  · If you  are allergic to pollens, grasses, trees, etc:  ? Stay inside in the morning when pollen counts are high.  ? Avoid going outside when the trees, grasses, or weeds are blooming.  ? Keep the windows of your house and car closed.  ? Use an air conditioner.  · If you are allergic to dust, molds, dust mites, pets, etc:  ? Clean your air conditioner or furnace filters regularly.  ? Cover pillows and mattresses with vinyl covers to lower your exposure to dust mites.  ? Wash bedding weekly in very hot water.  ? Use fewer dust-collecting items, such as curtains, bed skirts, carpeting, and stuffed animals, mainly in your bedroom.  ? If you can't avoid having a furry pet, vacuum often and keep your pet out of bedrooms and other rooms with carpets.  When do I need to call the doctor?   · You are having so much trouble breathing that you can only say one or two words at a time.  · You need to sit upright at all times to be able to breathe and or cannot lie down.  · You have severe swelling of your tongue, lips, or mouth.  · You have trouble breathing when talking or sitting still.  · You have a fever of 100.4°F (38°C) or higher.  · You have green or yellow mucus.  Teach Back: Helping You Understand   The Teach Back Method helps you understand the information we are giving you. After you talk with the staff, tell them in your own words what you learned. This helps to make sure the staff has described each thing clearly. It also helps to explain things that may have been confusing. Before going home, make sure you can do these:  · I can tell you about my condition.  · I can tell you what may help make the air .  · I can tell you what may help ease my allergies.  Where can I learn more?   Food and Drug Administration  https://www.fda.gov/ForConsumers/ConsumerUpdates/nhr284325.htm   NHS Choices  https://www.nhs.uk/conditions/Hay-fever/   Last Reviewed Date   2021-06-21  Consumer Information Use and Disclaimer   This  information is not specific medical advice and does not replace information you receive from your health care provider. This is only a brief summary of general information. It does NOT include all information about conditions, illnesses, injuries, tests, procedures, treatments, therapies, discharge instructions or life-style choices that may apply to you. You must talk with your health care provider for complete information about your health and treatment options. This information should not be used to decide whether or not to accept your health care providers advice, instructions or recommendations. Only your health care provider has the knowledge and training to provide advice that is right for you.  Copyright   Copyright © 2021 [employer] and its affiliates and/or licensors. All rights reserved.                      negative...

## 2022-02-05 NOTE — PATIENT INSTRUCTIONS
Patient Education       Seasonal Allergies Discharge Instructions   About this topic   Seasonal allergies are also called allergic rhinitis or hay fever. You may have sneezing; a stuffy or runny nose; or itchy throat, ears, or eyes. You may feel very tired. Most often, this problem is caused by:  · Pollens from trees, grasses, or weeds.  · Mold spores that grow when the air is humid, wet, or damp.  Some people can breathe in these things and have no problems. But when you have a seasonal allergy, your body acts as if the substance is harming you. Then you have symptoms. You may have symptoms only at some times of the year. If your symptoms last all year, they may be caused by:  · Insects, such as dust mites or cock roaches.  · Animals, such as cats or dogs.  · Mold spores.     What care is needed at home?   · Ask your doctor what you need to do when you go home. Make sure you ask questions if you do not understand what the doctor says.  · Rinse your nose with salt water to get rid of pollen inside of your nose.  · Keep the windows closed and use air conditioning.  · Shower before bed to rinse pollen from your hair and skin.  · Wear a dust mask if you need to be outside.  · Use over-the-counter medicines to help with your symptoms:  ? A steroid nose spray can help with a stuffy nose. It can also help with drainage down the back of your throat.  ? An antihistamine can help with itching, sneezing, or runny nose.  ? An antihistamine eye drop can help with itchy eyes.  ? A decongestant can help with a stuffy nose. Talk with your doctor or pharmacist about your other health problems before you use this kind of medicine. It may not be safe for people with high blood pressure.  What follow-up care is needed?   Your doctor may ask you to make visits to the office to check on your progress. Your doctor may ask you to see an allergy specialist, or to have testing done to learn what is causing your allergies. Be sure to keep  these visits.  What drugs may be needed?   The doctor may order drugs to treat the signs. Take your drugs as ordered. You may also take allergy shots if you have had allergy tests.  Will physical activity be limited?   You may have to limit your activity. If your health problem is caused by an allergy to pollens or molds, you may want to limit your time outdoors. Talk to your doctor about what activities are best for you.  What problems could happen?   · Your signs may not get better with your drugs  · Asthma may get worse  · Sinus infection  What can be done to prevent this health problem?   Try to avoid allergens.  · If you are allergic to pollens, grasses, trees, etc:  ? Stay inside in the morning when pollen counts are high.  ? Avoid going outside when the trees, grasses, or weeds are blooming.  ? Keep the windows of your house and car closed.  ? Use an air conditioner.  · If you are allergic to dust, molds, dust mites, pets, etc:  ? Clean your air conditioner or furnace filters regularly.  ? Cover pillows and mattresses with vinyl covers to lower your exposure to dust mites.  ? Wash bedding weekly in very hot water.  ? Use fewer dust-collecting items, such as curtains, bed skirts, carpeting, and stuffed animals, mainly in your bedroom.  ? If you can't avoid having a furry pet, vacuum often and keep your pet out of bedrooms and other rooms with carpets.  When do I need to call the doctor?   · You are having so much trouble breathing that you can only say one or two words at a time.  · You need to sit upright at all times to be able to breathe and or cannot lie down.  · You have severe swelling of your tongue, lips, or mouth.  · You have trouble breathing when talking or sitting still.  · You have a fever of 100.4°F (38°C) or higher.  · You have green or yellow mucus.  Teach Back: Helping You Understand   The Teach Back Method helps you understand the information we are giving you. After you talk with the staff,  tell them in your own words what you learned. This helps to make sure the staff has described each thing clearly. It also helps to explain things that may have been confusing. Before going home, make sure you can do these:  · I can tell you about my condition.  · I can tell you what may help make the air .  · I can tell you what may help ease my allergies.  Where can I learn more?   Food and Drug Administration  https://www.fda.gov/ForConsumers/ConsumerUpdates/tff547335.htm   NHS Choices  https://www.nhs.uk/conditions/Hay-fever/   Last Reviewed Date   2021-06-21  Consumer Information Use and Disclaimer   This information is not specific medical advice and does not replace information you receive from your health care provider. This is only a brief summary of general information. It does NOT include all information about conditions, illnesses, injuries, tests, procedures, treatments, therapies, discharge instructions or life-style choices that may apply to you. You must talk with your health care provider for complete information about your health and treatment options. This information should not be used to decide whether or not to accept your health care providers advice, instructions or recommendations. Only your health care provider has the knowledge and training to provide advice that is right for you.  Copyright   Copyright © 2021 UpToDate, Inc. and its affiliates and/or licensors. All rights reserved.

## 2022-04-23 ENCOUNTER — HOSPITAL ENCOUNTER (EMERGENCY)
Facility: HOSPITAL | Age: 27
Discharge: HOME | End: 2022-04-23
Attending: EMERGENCY MEDICINE
Payer: COMMERCIAL

## 2022-04-23 VITALS
HEART RATE: 62 BPM | DIASTOLIC BLOOD PRESSURE: 65 MMHG | BODY MASS INDEX: 28.47 KG/M2 | WEIGHT: 145 LBS | OXYGEN SATURATION: 98 % | HEIGHT: 60 IN | SYSTOLIC BLOOD PRESSURE: 112 MMHG | TEMPERATURE: 98 F | RESPIRATION RATE: 14 BRPM

## 2022-04-23 DIAGNOSIS — B34.9 VIRAL SYNDROME: Primary | ICD-10-CM

## 2022-04-23 LAB
INFLUENZA A, MOLECULAR: NEGATIVE
INFLUENZA B, MOLECULAR: NEGATIVE
SARS-COV-2 RDRP RESP QL NAA+PROBE: NEGATIVE
SPECIMEN SOURCE: NORMAL

## 2022-04-23 PROCEDURE — 99284 EMERGENCY DEPT VISIT MOD MDM: CPT | Mod: 25

## 2022-04-23 PROCEDURE — U0002 COVID-19 LAB TEST NON-CDC: HCPCS | Performed by: EMERGENCY MEDICINE

## 2022-04-23 PROCEDURE — 87502 INFLUENZA DNA AMP PROBE: CPT | Performed by: EMERGENCY MEDICINE

## 2022-04-23 PROCEDURE — 87502 INFLUENZA DNA AMP PROBE: CPT

## 2022-04-23 RX ORDER — PROMETHAZINE HYDROCHLORIDE 25 MG/1
25 TABLET ORAL EVERY 6 HOURS PRN
Qty: 15 TABLET | Refills: 0 | Status: SHIPPED | OUTPATIENT
Start: 2022-04-23 | End: 2022-07-20

## 2022-04-23 RX ORDER — BENZONATATE 100 MG/1
100 CAPSULE ORAL 3 TIMES DAILY PRN
Qty: 20 CAPSULE | Refills: 0 | Status: SHIPPED | OUTPATIENT
Start: 2022-04-23 | End: 2022-05-24 | Stop reason: ALTCHOICE

## 2022-04-23 RX ORDER — ALBUTEROL SULFATE 90 UG/1
1-2 AEROSOL, METERED RESPIRATORY (INHALATION) EVERY 6 HOURS PRN
Qty: 6.7 G | Refills: 0 | Status: SHIPPED | OUTPATIENT
Start: 2022-04-23 | End: 2022-05-24

## 2022-04-24 NOTE — ED PROVIDER NOTES
Encounter Date: 4/23/2022       History     Chief Complaint   Patient presents with    COVID-19 Concerns     Patient presents to the ED with reports of having had a positive home covid test on 04/21/22. Symptoms at this time include fever, fatigue, body aches, nausea, and decreased appetite.      Patient is a 26-year-old female with a past history of anxiety, depression, interstitial cystitis who presents to the ED with complaint of COVID concerns.  Patient reports myalgias, frontal headache, fever, nausea with her reported T-max of 103° F that, per patient, has not improved with antipyretics.  She denies any vomiting, diarrhea, rashes, neck pain or neck stiffness, vision change or other complaints.  She denies any shortness of breath mixed with sleep question.  Patient states she took a home COVID test on 4/2122 which was positive.  Patient requesting COVID testing in the ED today.  On arrival, patient's vital signs are stable with an oral temp of 97.9° F.         Review of patient's allergies indicates:   Allergen Reactions    Betadine [povidone-iodine]     Iodine and iodide containing products     Latex, natural rubber Rash    Scrub chlorhexidine gluconate [chlorhexidine gluconate] Rash     chrolaprep causes a reaction to the patient skin      Past Medical History:   Diagnosis Date    Anxiety     Depression     Endometriosis     IC (interstitial cystitis)      Past Surgical History:   Procedure Laterality Date    CYSTOSCOPY WITH HYDRODISTENSION OF BLADDER N/A 11/12/2019    Procedure: CYSTOSCOPY, WITH BLADDER HYDRODISTENSION;  Surgeon: Nathaniel Degroot MD;  Location: [facility] OR;  Service: Urology;  Laterality: N/A;    DIAGNOSTIC LAPAROSCOPY N/A 11/12/2019    Procedure: LAPAROSCOPY, DIAGNOSTIC;  Surgeon: Jamey Price MD;  Location: [facility] OR;  Service: OB/GYN;  Laterality: N/A;    DILATION AND CURETTAGE OF UTERUS      laproscopic surgery      x3    WISDOM TOOTH EXTRACTION       Family  History   Problem Relation Age of Onset    Hyperlipidemia Father     Endometriosis Sister     Anemia Sister     No Known Problems Brother      Social History     Tobacco Use    Smoking status: Never Smoker    Smokeless tobacco: Never Used   Substance Use Topics    Alcohol use: Not Currently     Comment: she has cut back since her fall     Drug use: Never     Review of Systems   Constitutional: Positive for fever. Negative for chills and fatigue.   HENT: Negative for congestion, facial swelling, postnasal drip, sinus pressure, sinus pain, sneezing and sore throat.    Respiratory: Negative for cough and shortness of breath.    Gastrointestinal: Positive for nausea. Negative for abdominal pain and vomiting.   Genitourinary: Negative for dysuria.   Musculoskeletal: Positive for myalgias. Negative for arthralgias and back pain.   Skin: Negative for pallor and rash.   Neurological: Negative for dizziness and headaches.   Psychiatric/Behavioral: Negative for agitation and confusion.       Physical Exam     Initial Vitals [04/23/22 2137]   BP Pulse Resp Temp SpO2   112/62 77 16 97.9 °F (36.6 °C) 99 %      MAP       --         Physical Exam    Nursing note and vitals reviewed.  Constitutional: She appears well-developed and well-nourished. No distress.   Well-appearing   Non toxic   No acute distress noted    HENT:   Head: Normocephalic and atraumatic.   Right Ear: External ear normal.   Left Ear: External ear normal.   Mouth/Throat: Oropharynx is clear and moist. No oropharyngeal exudate.   Eyes: Conjunctivae and EOM are normal. Pupils are equal, round, and reactive to light.   Neck: Neck supple.   Normal range of motion.  Cardiovascular: Normal rate, regular rhythm, normal heart sounds and intact distal pulses.   Pulmonary/Chest: Breath sounds normal.   Lungs clear to auscultation bilaterally    Abdominal: Abdomen is soft. Bowel sounds are normal.   Abdomen soft/NT/ND    Musculoskeletal:         General: Normal  range of motion.      Cervical back: Normal range of motion and neck supple.     Neurological: She is alert and oriented to person, place, and time. She has normal strength. GCS score is 15. GCS eye subscore is 4. GCS verbal subscore is 5. GCS motor subscore is 6.   Skin: Skin is warm. Capillary refill takes less than 2 seconds.         ED Course   Procedures  Labs Reviewed   INFLUENZA A & B BY MOLECULAR   SARS-COV-2 RNA AMPLIFICATION, QUAL          Imaging Results    None          Medications - No data to display  Medical Decision Making:   Clinical Tests:   Lab Tests: Reviewed and Ordered  ED Management:  - VSS; pt afebrile; NAD  - COVID 19 swab NEGATIVE  - Influenza A/B swab NEGATIVE  - patient well-appearing, nontoxic; will recommend symptomatic control for likely viral syndrome; patient stable for discharge at this time  - No further intervention is indicated at this time after having taken into account the patient's history, physical exam findings, and empirical and objective data obtained during the patient's emergency department workup.   - The patient is at low risk for an emergent medical condition at this time, and I am of the belief that that it is safe to discharge the patient from the emergency department.   - The patient is instructed to follow up as outpatient as indicated on the discharge paperwork.    - I have discussed the specifics of the workup with the patient and the patient has verbalized understanding of the details of the workup, the diagnosis, the treatment plan, and the need for outpatient follow-up.    - Although the patient has no emergent etiology today this does not preclude the development of an emergent condition so, in addition, I have advised the patient that they can return to the ED and/or activate EMS at any time with worsening of their symptoms, change of their symptoms, or with any other medical complaint.    - The patient remained comfortable and stable during their visit in  the ED.    - Discharge and follow-up instructions discussed with the patient who expressed understanding and willingness to comply with my recommendations.  - Results of all emergency department tests  discussed thoroughly with patient; all patient questions answered; pt in agreement with plan  - Pt instructed to follow up with PCP in 2-3 days for recheck of today's complaints  - Pt given strict emergency department return precautions for any new or worsening of symptoms  - Pt discharged from the emergency department in stable condition, in no acute distress                        Clinical Impression:   Final diagnoses:  [B34.9] Viral syndrome (Primary)          ED Disposition Condition    Discharge Stable        ED Prescriptions     Medication Sig Dispense Start Date End Date Auth. Provider    benzonatate (TESSALON) 100 MG capsule Take 1 capsule (100 mg total) by mouth 3 (three) times daily as needed for Cough. 20 capsule 4/23/2022  Alexys Jim MD    albuterol (PROVENTIL/VENTOLIN HFA) 90 mcg/actuation inhaler Inhale 1-2 puffs into the lungs every 6 (six) hours as needed for Wheezing. Rescue 6.7 g 4/23/2022 4/23/2023 Alexys Jim MD    promethazine (PHENERGAN) 25 MG tablet Take 1 tablet (25 mg total) by mouth every 6 (six) hours as needed for Nausea. 15 tablet 4/23/2022  Alexys Jim MD        Follow-up Information     Follow up With Specialties Details Why Contact Info    Nicolas Kenny MD Internal Medicine Schedule an appointment as soon as possible for a visit   [address] [address]  [phone]             Alexys Jim MD  04/24/22 0111

## 2022-05-23 ENCOUNTER — HOSPITAL ENCOUNTER (EMERGENCY)
Facility: HOSPITAL | Age: 27
Discharge: HOME | End: 2022-05-23
Attending: EMERGENCY MEDICINE
Payer: COMMERCIAL

## 2022-05-23 VITALS
OXYGEN SATURATION: 100 % | TEMPERATURE: 99 F | WEIGHT: 140 LBS | DIASTOLIC BLOOD PRESSURE: 76 MMHG | RESPIRATION RATE: 19 BRPM | BODY MASS INDEX: 27.34 KG/M2 | SYSTOLIC BLOOD PRESSURE: 128 MMHG | HEART RATE: 104 BPM

## 2022-05-23 DIAGNOSIS — F32.A DEPRESSION, UNSPECIFIED DEPRESSION TYPE: Primary | ICD-10-CM

## 2022-05-23 LAB
ALBUMIN SERPL BCP-MCNC: 4.4 G/DL (ref 3.5–5.2)
ALP SERPL-CCNC: 92 U/L (ref 55–135)
ALT SERPL W/O P-5'-P-CCNC: 19 U/L (ref 10–44)
AMPHET+METHAMPHET UR QL: NEGATIVE
ANION GAP SERPL CALC-SCNC: 9 MMOL/L (ref 8–16)
APAP SERPL-MCNC: <3 UG/ML (ref 10–20)
AST SERPL-CCNC: 15 U/L (ref 10–40)
B-HCG UR QL: NEGATIVE
BARBITURATES UR QL SCN>200 NG/ML: NEGATIVE
BASOPHILS # BLD AUTO: 0.02 K/UL (ref 0–0.2)
BASOPHILS NFR BLD: 0.2 % (ref 0–1.9)
BENZODIAZ UR QL SCN>200 NG/ML: ABNORMAL
BILIRUB SERPL-MCNC: 0.2 MG/DL (ref 0.1–1)
BILIRUB UR QL STRIP: NEGATIVE
BUN SERPL-MCNC: 5 MG/DL (ref 6–20)
BZE UR QL SCN: NEGATIVE
CALCIUM SERPL-MCNC: 9.6 MG/DL (ref 8.7–10.5)
CANNABINOIDS UR QL SCN: NEGATIVE
CHLORIDE SERPL-SCNC: 105 MMOL/L (ref 95–110)
CLARITY UR: CLEAR
CO2 SERPL-SCNC: 25 MMOL/L (ref 23–29)
COLOR UR: COLORLESS
CREAT SERPL-MCNC: 0.8 MG/DL (ref 0.5–1.4)
CREAT UR-MCNC: 60.1 MG/DL (ref 15–325)
CTP QC/QA: YES
DIFFERENTIAL METHOD: NORMAL
EOSINOPHIL # BLD AUTO: 0.1 K/UL (ref 0–0.5)
EOSINOPHIL NFR BLD: 1 % (ref 0–8)
ERYTHROCYTE [DISTWIDTH] IN BLOOD BY AUTOMATED COUNT: 12.9 % (ref 11.5–14.5)
EST. GFR  (AFRICAN AMERICAN): >60 ML/MIN/1.73 M^2
EST. GFR  (NON AFRICAN AMERICAN): >60 ML/MIN/1.73 M^2
ETHANOL SERPL-MCNC: <10 MG/DL
GLUCOSE SERPL-MCNC: 88 MG/DL (ref 70–110)
GLUCOSE UR QL STRIP: NEGATIVE
HCT VFR BLD AUTO: 38.3 % (ref 37–48.5)
HGB BLD-MCNC: 12.7 G/DL (ref 12–16)
HGB UR QL STRIP: NEGATIVE
IMM GRANULOCYTES # BLD AUTO: 0.02 K/UL (ref 0–0.04)
IMM GRANULOCYTES NFR BLD AUTO: 0.2 % (ref 0–0.5)
KETONES UR QL STRIP: NEGATIVE
LEUKOCYTE ESTERASE UR QL STRIP: NEGATIVE
LYMPHOCYTES # BLD AUTO: 2.3 K/UL (ref 1–4.8)
LYMPHOCYTES NFR BLD: 26.7 % (ref 18–48)
MCH RBC QN AUTO: 29.7 PG (ref 27–31)
MCHC RBC AUTO-ENTMCNC: 33.2 G/DL (ref 32–36)
MCV RBC AUTO: 90 FL (ref 82–98)
METHADONE UR QL SCN>300 NG/ML: NEGATIVE
MONOCYTES # BLD AUTO: 0.8 K/UL (ref 0.3–1)
MONOCYTES NFR BLD: 9 % (ref 4–15)
NEUTROPHILS # BLD AUTO: 5.4 K/UL (ref 1.8–7.7)
NEUTROPHILS NFR BLD: 62.9 % (ref 38–73)
NITRITE UR QL STRIP: NEGATIVE
NRBC BLD-RTO: 0 /100 WBC
OPIATES UR QL SCN: NEGATIVE
PCP UR QL SCN>25 NG/ML: NEGATIVE
PH UR STRIP: 6 [PH] (ref 5–8)
PLATELET # BLD AUTO: 286 K/UL (ref 150–450)
PMV BLD AUTO: 9.3 FL (ref 9.2–12.9)
POTASSIUM SERPL-SCNC: 4.5 MMOL/L (ref 3.5–5.1)
PROT SERPL-MCNC: 7.8 G/DL (ref 6–8.4)
PROT UR QL STRIP: NEGATIVE
RBC # BLD AUTO: 4.28 M/UL (ref 4–5.4)
SODIUM SERPL-SCNC: 139 MMOL/L (ref 136–145)
SP GR UR STRIP: 1.01 (ref 1–1.03)
TOXICOLOGY INFORMATION: ABNORMAL
URN SPEC COLLECT METH UR: ABNORMAL
UROBILINOGEN UR STRIP-ACNC: NEGATIVE EU/DL
WBC # BLD AUTO: 8.58 K/UL (ref 3.9–12.7)

## 2022-05-23 PROCEDURE — 82077 ASSAY SPEC XCP UR&BREATH IA: CPT | Performed by: EMERGENCY MEDICINE

## 2022-05-23 PROCEDURE — 80307 DRUG TEST PRSMV CHEM ANLYZR: CPT | Performed by: EMERGENCY MEDICINE

## 2022-05-23 PROCEDURE — G0425 PR INPT TELEHEALTH CONSULT 30M: ICD-10-PCS | Mod: 95,,, | Performed by: PSYCHIATRY & NEUROLOGY

## 2022-05-23 PROCEDURE — 80053 COMPREHEN METABOLIC PANEL: CPT | Performed by: EMERGENCY MEDICINE

## 2022-05-23 PROCEDURE — 81025 URINE PREGNANCY TEST: CPT | Performed by: EMERGENCY MEDICINE

## 2022-05-23 PROCEDURE — 80143 DRUG ASSAY ACETAMINOPHEN: CPT | Performed by: EMERGENCY MEDICINE

## 2022-05-23 PROCEDURE — G0425 INPT/ED TELECONSULT30: HCPCS | Mod: 95,,, | Performed by: PSYCHIATRY & NEUROLOGY

## 2022-05-23 PROCEDURE — 99283 EMERGENCY DEPT VISIT LOW MDM: CPT | Mod: 25

## 2022-05-23 PROCEDURE — 85025 COMPLETE CBC W/AUTO DIFF WBC: CPT | Performed by: EMERGENCY MEDICINE

## 2022-05-23 PROCEDURE — 81003 URINALYSIS AUTO W/O SCOPE: CPT | Mod: 59 | Performed by: EMERGENCY MEDICINE

## 2022-05-23 RX ORDER — ESCITALOPRAM OXALATE 10 MG/1
10 TABLET ORAL DAILY
Qty: 30 TABLET | Refills: 0 | Status: SHIPPED | OUTPATIENT
Start: 2022-05-23 | End: 2022-06-09 | Stop reason: SDUPTHER

## 2022-05-24 NOTE — DISCHARGE INSTRUCTIONS
[facility] (intensive outpatient program)   [phone] option 3  [email or web address]  The [facility] is an intensive outpatient program for individuals facing mental health challenges requiring more intensive treatment than traditional outpatient therapy and medication management. This structured day program is designed to assist patients with their current psychological and medication management needs while teaching them the skills necessary for achieving and maintaining greater health and well-being.  Many patients in the program experience problems such as:  Depression  Anxiety  Sleep difficulties  PTSD  Panic Attacks and Phobias  Bipolar Disorder  Chronic Medical Issues  Relationship issues  Work stress  Grief and Loss  Parent-child relational issues  Program Structure  Our team of psychiatrists, clinical psychologists, social workers, and psychiatric nurses address the broad scope of patients needs. New patients receive a thorough assessment that includes a full psychiatric interview, a review of their medical history and current medication regimen. If needed, meetings are conducted with the referring professional or family members.  Our holistic program skillfully navigates every patients biopsychosocial needs with evidence based and supportive therapies. We are adept at distinguishing between diagnoses and other medical or psychological illnesses, pinpointing areas of treatment and formulating the most effective treatment plans, in collaboration with the patient.  Through their active participation in the program, patients are taught ways to take personal responsibility for their overall health and well-being.  The daily program consists of group therapy and educational classes, with individual therapy and medication management when needed. The program emphasizes group therapy process and application of skills in a safe,  nonjudgmental environment. These sessions facilitate the development of skills to assist in regaining a higher level of functioning and enhancing quality of life.    In case of suicidal thinking, return to ED and/or call the [facility] LINE [phone] / [phone]. Can also text HOME to [phone] to connect with a Crisis Counselor. Free 24/7 support at your fingertips. Message us on Scripped at 443-SUPPORT              MENTAL HEALTH/ADDICTIVE DISORDERS  REFERRAL RECOMMENDATIONS    I. AA ([phone]) [email or web address] or NA ([phone])    II. [facility] [phone], option 2    III. Other Places for Outpatient Addictive Disorders and Mental Health Treatment in [facility]:    [facility] (Karina Mack, [place]; accepts Medicaid, commercial insurance) [phone]    [facility] ([place]es) [phone], [address] [facility] [phone]; Crisis [phone] - Call for options A-E:    ? [facility] [place], [address] [place] [address]    ? Critical access hospital/[facility], [address] [place] [address]    ? [facility], [address] [place] [address],    ? [facility], 2nd floor [address] [place] [address]    ? [facility] [place], [address], [facility] [address]    ? [facility], [place] [address]    [facility] [place], [address] [place] [address], [phone]    [facility], [place] [phone]    [facility] (Mental & General), [facility], [phone]    [facility] (Mental & General Health, not only HIV+, 3 [place] locations) [phone]    [facility], [address] [facility], [address], [phone]     [facility], [address] [facility] Street, [phone], [phone]    [facility] (Methadone Maintenance)    ? [address] [place] [address], [phone]    ? [place] [address] [phone]    IV. Addiction and Mental Health Treatment in Other [facility]:    [facility], [address] [place], [phone]    [facility], [phone], [address] [facility], [address], [phone]    [facility]. [address] [facility], [phone]    [facility], [address] [facility], [phone]    [facility]SA    ? [facility], [address] [facility], [phone] ([facility])    ? [facility], [address] [facility], [phone] ([facility])    ? [facility], [address] [facility], [address], [place], [phone] ([place])    ? [facility], [address] [place], [phone] ([facility])    [facility] Hotline [phone], [phone]    ? [facility], [address] [facility]    ? [facility], [address] [facility], [place]    ? [facility], [address] [facility], [place]    ? [facility], [address] [facility] [address] [place]    ? [facility], [address], [place]    [facility], [address], [place] [phone]    [facility], [address] [facility] [phone]    [facility], [address] [facility] [phone]    [facility]. for [facility] Recovery, [address] [facility], [phone]    V. Residential Addictive Disorders Treatment (call every day  until you get in):    [facility] [address] [facility], [phone]    [facility] (men only) [address] [facility], [phone]    [facility], [address] [facility], mens program [phone], womens program [phone]    [facility], [address], [phone]    [facility] (Female only) [address] [facility], [phone]    [facility] (IOP, residential, low cost, MCaid) [address], [place], [phone]    [facility] (Men only, [phone]), [address] [facility], [place]    [facility] (Pregnant/women with or without children, [phone])    [facility] (Women only), [address] [facility], [phone]    [facility] (men only), [place] [phone]    VI. Inpatient Rehabs (out of area)    [place], [place], [phone]     [facility], [phone]    [facility], [phone]    [place], [place] ([phone])    [facility] ([place]) [phone]    VII. In case of suicidal thinking, return to ED and/or call the COPE LINE [phone] / [phone]

## 2022-05-24 NOTE — ED PROVIDER NOTES
Encounter Date: 5/23/2022       History     Chief Complaint   Patient presents with    Psychiatric Evaluation     Patient has a history of panic attacks. Denies any triggers. States she was started on Wellbutrin over a month ago but has not taken consistently until 2.5 weeks ago. Having SI thoughts while driving home today from work which she has never had before.  Patient crying in triage.      26-year-old female presents emergency department complaining of suicidal ideation.  Patient notes significant history of anxiety and depression.  States she started taking Wellbutrin regularly about 2 or 3 weeks ago.  States over the past few days she has had increasing incidence of suicidal ideation.  Notes intrusive thoughts about suicide.  This worsened today.  For example, patient states on her way home she was driving and had thoughts of just driving into oncoming traffic.  Has not acted on these symptoms.  Denies any somatic complaints.  No other symptoms reported at this time.         Review of patient's allergies indicates:   Allergen Reactions    Betadine [povidone-iodine]     Iodine and iodide containing products     Latex, natural rubber Rash    Scrub chlorhexidine gluconate [chlorhexidine gluconate] Rash     chrolaprep causes a reaction to the patient skin      Past Medical History:   Diagnosis Date    Anxiety     Depression     Endometriosis     IC (interstitial cystitis)      Past Surgical History:   Procedure Laterality Date    CYSTOSCOPY WITH HYDRODISTENSION OF BLADDER N/A 11/12/2019    Procedure: CYSTOSCOPY, WITH BLADDER HYDRODISTENSION;  Surgeon: Nathaniel Degroot MD;  Location: [facility] OR;  Service: Urology;  Laterality: N/A;    DIAGNOSTIC LAPAROSCOPY N/A 11/12/2019    Procedure: LAPAROSCOPY, DIAGNOSTIC;  Surgeon: Jamey Price MD;  Location: [facility] OR;  Service: OB/GYN;  Laterality: N/A;    DILATION AND CURETTAGE OF UTERUS      laproscopic surgery      x3    WISDOM TOOTH EXTRACTION        Family History   Problem Relation Age of Onset    Hyperlipidemia Father     Endometriosis Sister     Anemia Sister     No Known Problems Brother      Social History     Tobacco Use    Smoking status: Never Smoker    Smokeless tobacco: Never Used   Substance Use Topics    Alcohol use: Not Currently     Comment: she has cut back since her fall     Drug use: Never     Review of Systems   Constitutional: Negative for chills, fatigue and fever.   HENT: Negative for congestion and sore throat.    Eyes: Negative for photophobia and visual disturbance.   Respiratory: Negative for cough and shortness of breath.    Cardiovascular: Negative for chest pain and palpitations.   Gastrointestinal: Negative for abdominal pain, diarrhea and vomiting.   Musculoskeletal: Negative for back pain, neck pain and neck stiffness.   Neurological: Negative for light-headedness, numbness and headaches.       Physical Exam     Initial Vitals [05/23/22 1939]   BP Pulse Resp Temp SpO2   130/83 (!) 112 18 98.4 °F (36.9 °C) 98 %      MAP       --         Physical Exam    Nursing note and vitals reviewed.  Constitutional: She appears well-developed and well-nourished. No distress.   HENT:   Head: Normocephalic and atraumatic.   Eyes: Conjunctivae and EOM are normal. Pupils are equal, round, and reactive to light.   Neck: Neck supple. No tracheal deviation present.   Normal range of motion.  Cardiovascular: Normal rate, regular rhythm and intact distal pulses.   Pulmonary/Chest: No respiratory distress.   Musculoskeletal:         General: No tenderness or edema. Normal range of motion.      Cervical back: Normal range of motion and neck supple.     Neurological: She is alert and oriented to person, place, and time. She has normal strength. No cranial nerve deficit. GCS score is 15. GCS eye subscore is 4. GCS verbal subscore is 5. GCS motor subscore is 6.   Skin: Skin is warm and dry.         ED Course   Procedures  Labs Reviewed    COMPREHENSIVE METABOLIC PANEL - Abnormal; Notable for the following components:       Result Value    BUN 5 (*)     All other components within normal limits   DRUG SCREEN PANEL, URINE EMERGENCY - Abnormal; Notable for the following components:    Benzodiazepines Presumptive Positive (*)     All other components within normal limits    Narrative:     Specimen Source->Urine   ACETAMINOPHEN LEVEL - Abnormal; Notable for the following components:    Acetaminophen (Tylenol), Serum <3.0 (*)     All other components within normal limits   URINALYSIS - Abnormal; Notable for the following components:    Color, UA Colorless (*)     All other components within normal limits    Narrative:     Specimen Source->Urine   CBC W/ AUTO DIFFERENTIAL   ALCOHOL,MEDICAL (ETHANOL)   POCT URINE PREGNANCY               Medications - No data to display  Medical Decision Making:   Initial Assessment:   26-year-old female presents emergency department complaining of depression with suicidal ideation  Differential Diagnosis:   Toxidrome, overdose, withdrawal, psychosis, depression  Clinical Tests:   Lab Tests: Reviewed       <> Summary of Lab: Benign  ED Management:  I have ordered a tele psych evaluation on this patient.  Discussed the case with the psychiatrist, who recommends discharge, discontinuing the Wellbutrin, and prescription for Lexapro 10 mg. She has placed resources in the chart and discussed the case with family and the patient, given them a home management instructions and outpatient follow-up instructions.  Vital signs stable, patient and family comfortable with discharge at this time.                      Clinical Impression:   Final diagnoses:  [F32.A] Depression, unspecified depression type (Primary)          ED Disposition Condition    Discharge Stable        ED Prescriptions     Medication Sig Dispense Start Date End Date Auth. Provider    EScitalopram oxalate (LEXAPRO) 10 MG tablet Take 1 tablet (10 mg total) by mouth  once daily. 30 tablet 5/23/2022 5/23/2023 Mode Hernandez MD        Follow-up Information     Follow up With Specialties Details Why Contact Info    Nicolas Kenny MD Internal Medicine Schedule an appointment as soon as possible for a visit   [address] [place] [id number]  [phone]             Mode Hernandez MD  05/23/22 [id number]

## 2022-05-25 ENCOUNTER — TELEPHONE (OUTPATIENT)
Dept: PSYCHIATRY | Facility: CLINIC | Age: 27
End: 2022-05-25
Payer: COMMERCIAL

## 2022-05-25 NOTE — TELEPHONE ENCOUNTER
Scheduled patient for next New Patient visit with Dr Villafana for August 4th at 1:30pm. She states she feels stable enough to wait until then. Also put her on a waiting list if we have any sooner appointments before available.

## 2022-05-25 NOTE — TELEPHONE ENCOUNTER
----- Message from Lakeshia Silva sent at 5/24/2022  3:17 PM CDT -----  URGENT referral. Please contact patient with an appt. Thanks

## 2022-08-01 ENCOUNTER — OFFICE VISIT (OUTPATIENT)
Dept: PSYCHIATRY | Facility: CLINIC | Age: 27
End: 2022-08-01
Payer: COMMERCIAL

## 2022-08-01 VITALS
HEART RATE: 109 BPM | OXYGEN SATURATION: 98 % | SYSTOLIC BLOOD PRESSURE: 114 MMHG | DIASTOLIC BLOOD PRESSURE: 76 MMHG | HEIGHT: 60 IN | RESPIRATION RATE: 16 BRPM | WEIGHT: 165.25 LBS | BODY MASS INDEX: 32.44 KG/M2

## 2022-08-01 DIAGNOSIS — F41.0 PANIC ATTACKS: ICD-10-CM

## 2022-08-01 DIAGNOSIS — G47.00 INSOMNIA, UNSPECIFIED TYPE: ICD-10-CM

## 2022-08-01 DIAGNOSIS — F33.2 SEVERE EPISODE OF RECURRENT MAJOR DEPRESSIVE DISORDER, WITHOUT PSYCHOTIC FEATURES: ICD-10-CM

## 2022-08-01 DIAGNOSIS — Z13.31 POSITIVE DEPRESSION SCREENING: ICD-10-CM

## 2022-08-01 DIAGNOSIS — F41.1 GAD (GENERALIZED ANXIETY DISORDER): Primary | ICD-10-CM

## 2022-08-01 PROCEDURE — 3074F SYST BP LT 130 MM HG: CPT | Mod: CPTII,S$GLB,, | Performed by: STUDENT IN AN ORGANIZED HEALTH CARE EDUCATION/TRAINING PROGRAM

## 2022-08-01 PROCEDURE — 1159F MED LIST DOCD IN RCRD: CPT | Mod: CPTII,S$GLB,, | Performed by: STUDENT IN AN ORGANIZED HEALTH CARE EDUCATION/TRAINING PROGRAM

## 2022-08-01 PROCEDURE — 90792 PSYCH DIAG EVAL W/MED SRVCS: CPT | Mod: S$GLB,,, | Performed by: STUDENT IN AN ORGANIZED HEALTH CARE EDUCATION/TRAINING PROGRAM

## 2022-08-01 PROCEDURE — 1160F RVW MEDS BY RX/DR IN RCRD: CPT | Mod: CPTII,S$GLB,, | Performed by: STUDENT IN AN ORGANIZED HEALTH CARE EDUCATION/TRAINING PROGRAM

## 2022-08-01 PROCEDURE — 3008F PR BODY MASS INDEX (BMI) DOCUMENTED: ICD-10-PCS | Mod: CPTII,S$GLB,, | Performed by: STUDENT IN AN ORGANIZED HEALTH CARE EDUCATION/TRAINING PROGRAM

## 2022-08-01 PROCEDURE — 3008F BODY MASS INDEX DOCD: CPT | Mod: CPTII,S$GLB,, | Performed by: STUDENT IN AN ORGANIZED HEALTH CARE EDUCATION/TRAINING PROGRAM

## 2022-08-01 PROCEDURE — 1159F PR MEDICATION LIST DOCUMENTED IN MEDICAL RECORD: ICD-10-PCS | Mod: CPTII,S$GLB,, | Performed by: STUDENT IN AN ORGANIZED HEALTH CARE EDUCATION/TRAINING PROGRAM

## 2022-08-01 PROCEDURE — 99999 PR PBB SHADOW E&M-EST. PATIENT-LVL III: CPT | Mod: PBBFAC,,, | Performed by: STUDENT IN AN ORGANIZED HEALTH CARE EDUCATION/TRAINING PROGRAM

## 2022-08-01 PROCEDURE — 3074F PR MOST RECENT SYSTOLIC BLOOD PRESSURE < 130 MM HG: ICD-10-PCS | Mod: CPTII,S$GLB,, | Performed by: STUDENT IN AN ORGANIZED HEALTH CARE EDUCATION/TRAINING PROGRAM

## 2022-08-01 PROCEDURE — 3078F PR MOST RECENT DIASTOLIC BLOOD PRESSURE < 80 MM HG: ICD-10-PCS | Mod: CPTII,S$GLB,, | Performed by: STUDENT IN AN ORGANIZED HEALTH CARE EDUCATION/TRAINING PROGRAM

## 2022-08-01 PROCEDURE — 3078F DIAST BP <80 MM HG: CPT | Mod: CPTII,S$GLB,, | Performed by: STUDENT IN AN ORGANIZED HEALTH CARE EDUCATION/TRAINING PROGRAM

## 2022-08-01 PROCEDURE — 90792 PR PSYCHIATRIC DIAGNOSTIC EVALUATION W/MEDICAL SERVICES: ICD-10-PCS | Mod: S$GLB,,, | Performed by: STUDENT IN AN ORGANIZED HEALTH CARE EDUCATION/TRAINING PROGRAM

## 2022-08-01 PROCEDURE — 99999 PR PBB SHADOW E&M-EST. PATIENT-LVL III: ICD-10-PCS | Mod: PBBFAC,,, | Performed by: STUDENT IN AN ORGANIZED HEALTH CARE EDUCATION/TRAINING PROGRAM

## 2022-08-01 PROCEDURE — 1160F PR REVIEW ALL MEDS BY PRESCRIBER/CLIN PHARMACIST DOCUMENTED: ICD-10-PCS | Mod: CPTII,S$GLB,, | Performed by: STUDENT IN AN ORGANIZED HEALTH CARE EDUCATION/TRAINING PROGRAM

## 2022-08-01 RX ORDER — CLONAZEPAM 1 MG/1
1 TABLET ORAL 3 TIMES DAILY PRN
Qty: 90 TABLET | Refills: 2 | Status: SHIPPED | OUTPATIENT
Start: 2022-08-01 | End: 2022-08-23 | Stop reason: ALTCHOICE

## 2022-08-01 RX ORDER — TRAZODONE HYDROCHLORIDE 100 MG/1
100 TABLET ORAL NIGHTLY
Qty: 30 TABLET | Refills: 2 | Status: SHIPPED | OUTPATIENT
Start: 2022-08-01 | End: 2022-08-23 | Stop reason: SDUPTHER
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
## 2022-08-23 ENCOUNTER — OFFICE VISIT (OUTPATIENT)
Dept: PSYCHIATRY | Facility: CLINIC | Age: 27
End: 2022-08-23
Payer: COMMERCIAL

## 2022-08-23 ENCOUNTER — PATIENT MESSAGE (OUTPATIENT)
Dept: PSYCHIATRY | Facility: CLINIC | Age: 27
End: 2022-08-23
Payer: COMMERCIAL

## 2022-08-23 VITALS
RESPIRATION RATE: 17 BRPM | WEIGHT: 168.13 LBS | HEART RATE: 83 BPM | SYSTOLIC BLOOD PRESSURE: 122 MMHG | BODY MASS INDEX: 33.01 KG/M2 | HEIGHT: 60 IN | DIASTOLIC BLOOD PRESSURE: 78 MMHG | OXYGEN SATURATION: 99 %

## 2022-08-23 DIAGNOSIS — G47.00 INSOMNIA, UNSPECIFIED TYPE: ICD-10-CM

## 2022-08-23 DIAGNOSIS — F41.0 PANIC ATTACKS: ICD-10-CM

## 2022-08-23 DIAGNOSIS — F33.2 SEVERE EPISODE OF RECURRENT MAJOR DEPRESSIVE DISORDER, WITHOUT PSYCHOTIC FEATURES: ICD-10-CM

## 2022-08-23 DIAGNOSIS — F41.1 GAD (GENERALIZED ANXIETY DISORDER): Primary | ICD-10-CM

## 2022-08-23 DIAGNOSIS — F41.9 ANXIETY: ICD-10-CM

## 2022-08-23 DIAGNOSIS — F32.A DEPRESSION, UNSPECIFIED DEPRESSION TYPE: ICD-10-CM

## 2022-08-23 PROCEDURE — 1160F PR REVIEW ALL MEDS BY PRESCRIBER/CLIN PHARMACIST DOCUMENTED: ICD-10-PCS | Mod: CPTII,S$GLB,, | Performed by: STUDENT IN AN ORGANIZED HEALTH CARE EDUCATION/TRAINING PROGRAM

## 2022-08-23 PROCEDURE — 3074F PR MOST RECENT SYSTOLIC BLOOD PRESSURE < 130 MM HG: ICD-10-PCS | Mod: CPTII,S$GLB,, | Performed by: STUDENT IN AN ORGANIZED HEALTH CARE EDUCATION/TRAINING PROGRAM

## 2022-08-23 PROCEDURE — 1160F RVW MEDS BY RX/DR IN RCRD: CPT | Mod: CPTII,S$GLB,, | Performed by: STUDENT IN AN ORGANIZED HEALTH CARE EDUCATION/TRAINING PROGRAM

## 2022-08-23 PROCEDURE — 3008F BODY MASS INDEX DOCD: CPT | Mod: CPTII,S$GLB,, | Performed by: STUDENT IN AN ORGANIZED HEALTH CARE EDUCATION/TRAINING PROGRAM

## 2022-08-23 PROCEDURE — 99999 PR PBB SHADOW E&M-EST. PATIENT-LVL III: CPT | Mod: PBBFAC,,, | Performed by: STUDENT IN AN ORGANIZED HEALTH CARE EDUCATION/TRAINING PROGRAM

## 2022-08-23 PROCEDURE — 1159F MED LIST DOCD IN RCRD: CPT | Mod: CPTII,S$GLB,, | Performed by: STUDENT IN AN ORGANIZED HEALTH CARE EDUCATION/TRAINING PROGRAM

## 2022-08-23 PROCEDURE — 99214 OFFICE O/P EST MOD 30 MIN: CPT | Mod: S$GLB,,, | Performed by: STUDENT IN AN ORGANIZED HEALTH CARE EDUCATION/TRAINING PROGRAM

## 2022-08-23 PROCEDURE — 3078F PR MOST RECENT DIASTOLIC BLOOD PRESSURE < 80 MM HG: ICD-10-PCS | Mod: CPTII,S$GLB,, | Performed by: STUDENT IN AN ORGANIZED HEALTH CARE EDUCATION/TRAINING PROGRAM

## 2022-08-23 PROCEDURE — 99214 PR OFFICE/OUTPT VISIT, EST, LEVL IV, 30-39 MIN: ICD-10-PCS | Mod: S$GLB,,, | Performed by: STUDENT IN AN ORGANIZED HEALTH CARE EDUCATION/TRAINING PROGRAM

## 2022-08-23 PROCEDURE — 99999 PR PBB SHADOW E&M-EST. PATIENT-LVL III: ICD-10-PCS | Mod: PBBFAC,,, | Performed by: STUDENT IN AN ORGANIZED HEALTH CARE EDUCATION/TRAINING PROGRAM

## 2022-08-23 PROCEDURE — 3078F DIAST BP <80 MM HG: CPT | Mod: CPTII,S$GLB,, | Performed by: STUDENT IN AN ORGANIZED HEALTH CARE EDUCATION/TRAINING PROGRAM

## 2022-08-23 PROCEDURE — 3008F PR BODY MASS INDEX (BMI) DOCUMENTED: ICD-10-PCS | Mod: CPTII,S$GLB,, | Performed by: STUDENT IN AN ORGANIZED HEALTH CARE EDUCATION/TRAINING PROGRAM

## 2022-08-23 PROCEDURE — 3074F SYST BP LT 130 MM HG: CPT | Mod: CPTII,S$GLB,, | Performed by: STUDENT IN AN ORGANIZED HEALTH CARE EDUCATION/TRAINING PROGRAM

## 2022-08-23 PROCEDURE — 90833 PR PSYCHOTHERAPY W/PATIENT W/E&M, 30 MIN (ADD ON): ICD-10-PCS | Mod: S$GLB,,, | Performed by: STUDENT IN AN ORGANIZED HEALTH CARE EDUCATION/TRAINING PROGRAM

## 2022-08-23 PROCEDURE — 90833 PSYTX W PT W E/M 30 MIN: CPT | Mod: S$GLB,,, | Performed by: STUDENT IN AN ORGANIZED HEALTH CARE EDUCATION/TRAINING PROGRAM

## 2022-08-23 PROCEDURE — 1159F PR MEDICATION LIST DOCUMENTED IN MEDICAL RECORD: ICD-10-PCS | Mod: CPTII,S$GLB,, | Performed by: STUDENT IN AN ORGANIZED HEALTH CARE EDUCATION/TRAINING PROGRAM

## 2022-08-23 RX ORDER — TRAZODONE HYDROCHLORIDE 100 MG/1
100 TABLET ORAL NIGHTLY
Qty: 30 TABLET | Refills: 2 | Status: SHIPPED | OUTPATIENT
Start: 2022-08-23 | End: 2022-09-09

## 2022-08-23 RX ORDER — LORAZEPAM 2 MG/1
2 TABLET ORAL EVERY 8 HOURS PRN
Qty: 90 TABLET | Refills: 1 | Status: SHIPPED | OUTPATIENT
Start: 2022-08-23 | End: 2022-09-28 | Stop reason: ALTCHOICE

## 2022-08-23 RX ORDER — LAMOTRIGINE 25 MG/1
TABLET ORAL
Qty: 120 TABLET | Refills: 1 | Status: SHIPPED | OUTPATIENT
Start: 2022-08-23 | End: 2022-11-16 | Stop reason: SDUPTHER

## 2022-08-23 RX ORDER — PREGABALIN 25 MG/1
25 CAPSULE ORAL 3 TIMES DAILY
Qty: 90 CAPSULE | Refills: 1 | Status: SHIPPED | OUTPATIENT
Start: 2022-08-23 | End: 2022-09-09 | Stop reason: SDUPTHER
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
## 2022-09-08 ENCOUNTER — TELEPHONE (OUTPATIENT)
Dept: PSYCHIATRY | Facility: CLINIC | Age: 27
End: 2022-09-08
Payer: COMMERCIAL

## 2022-09-08 NOTE — TELEPHONE ENCOUNTER
"Patient states, " Having a lot of side effects to one of my medications: vivid nightmares, trouble sleeping, double vision, hallucinations, paranoia. She is not having panic attacks on the Ativan. Please advise.   "

## 2022-09-09 ENCOUNTER — OFFICE VISIT (OUTPATIENT)
Dept: PSYCHIATRY | Facility: CLINIC | Age: 27
End: 2022-09-09
Payer: COMMERCIAL

## 2022-09-09 VITALS
HEIGHT: 60 IN | BODY MASS INDEX: 31.38 KG/M2 | RESPIRATION RATE: 16 BRPM | WEIGHT: 159.81 LBS | DIASTOLIC BLOOD PRESSURE: 84 MMHG | HEART RATE: 104 BPM | SYSTOLIC BLOOD PRESSURE: 122 MMHG

## 2022-09-09 DIAGNOSIS — F41.1 GAD (GENERALIZED ANXIETY DISORDER): Primary | ICD-10-CM

## 2022-09-09 DIAGNOSIS — F33.2 SEVERE EPISODE OF RECURRENT MAJOR DEPRESSIVE DISORDER, WITHOUT PSYCHOTIC FEATURES: ICD-10-CM

## 2022-09-09 DIAGNOSIS — F41.0 PANIC ATTACKS: ICD-10-CM

## 2022-09-09 PROCEDURE — 1159F MED LIST DOCD IN RCRD: CPT | Mod: CPTII,S$GLB,, | Performed by: STUDENT IN AN ORGANIZED HEALTH CARE EDUCATION/TRAINING PROGRAM

## 2022-09-09 PROCEDURE — 3008F BODY MASS INDEX DOCD: CPT | Mod: CPTII,S$GLB,, | Performed by: STUDENT IN AN ORGANIZED HEALTH CARE EDUCATION/TRAINING PROGRAM

## 2022-09-09 PROCEDURE — 1160F RVW MEDS BY RX/DR IN RCRD: CPT | Mod: CPTII,S$GLB,, | Performed by: STUDENT IN AN ORGANIZED HEALTH CARE EDUCATION/TRAINING PROGRAM

## 2022-09-09 PROCEDURE — 3079F DIAST BP 80-89 MM HG: CPT | Mod: CPTII,S$GLB,, | Performed by: STUDENT IN AN ORGANIZED HEALTH CARE EDUCATION/TRAINING PROGRAM

## 2022-09-09 PROCEDURE — 90833 PSYTX W PT W E/M 30 MIN: CPT | Mod: S$GLB,,, | Performed by: STUDENT IN AN ORGANIZED HEALTH CARE EDUCATION/TRAINING PROGRAM

## 2022-09-09 PROCEDURE — 99214 PR OFFICE/OUTPT VISIT, EST, LEVL IV, 30-39 MIN: ICD-10-PCS | Mod: S$GLB,,, | Performed by: STUDENT IN AN ORGANIZED HEALTH CARE EDUCATION/TRAINING PROGRAM

## 2022-09-09 PROCEDURE — 1160F PR REVIEW ALL MEDS BY PRESCRIBER/CLIN PHARMACIST DOCUMENTED: ICD-10-PCS | Mod: CPTII,S$GLB,, | Performed by: STUDENT IN AN ORGANIZED HEALTH CARE EDUCATION/TRAINING PROGRAM

## 2022-09-09 PROCEDURE — 3079F PR MOST RECENT DIASTOLIC BLOOD PRESSURE 80-89 MM HG: ICD-10-PCS | Mod: CPTII,S$GLB,, | Performed by: STUDENT IN AN ORGANIZED HEALTH CARE EDUCATION/TRAINING PROGRAM

## 2022-09-09 PROCEDURE — 3074F PR MOST RECENT SYSTOLIC BLOOD PRESSURE < 130 MM HG: ICD-10-PCS | Mod: CPTII,S$GLB,, | Performed by: STUDENT IN AN ORGANIZED HEALTH CARE EDUCATION/TRAINING PROGRAM

## 2022-09-09 PROCEDURE — 99999 PR PBB SHADOW E&M-EST. PATIENT-LVL III: CPT | Mod: PBBFAC,,, | Performed by: STUDENT IN AN ORGANIZED HEALTH CARE EDUCATION/TRAINING PROGRAM

## 2022-09-09 PROCEDURE — 3008F PR BODY MASS INDEX (BMI) DOCUMENTED: ICD-10-PCS | Mod: CPTII,S$GLB,, | Performed by: STUDENT IN AN ORGANIZED HEALTH CARE EDUCATION/TRAINING PROGRAM

## 2022-09-09 PROCEDURE — 90833 PR PSYCHOTHERAPY W/PATIENT W/E&M, 30 MIN (ADD ON): ICD-10-PCS | Mod: S$GLB,,, | Performed by: STUDENT IN AN ORGANIZED HEALTH CARE EDUCATION/TRAINING PROGRAM

## 2022-09-09 PROCEDURE — 99214 OFFICE O/P EST MOD 30 MIN: CPT | Mod: S$GLB,,, | Performed by: STUDENT IN AN ORGANIZED HEALTH CARE EDUCATION/TRAINING PROGRAM

## 2022-09-09 PROCEDURE — 1159F PR MEDICATION LIST DOCUMENTED IN MEDICAL RECORD: ICD-10-PCS | Mod: CPTII,S$GLB,, | Performed by: STUDENT IN AN ORGANIZED HEALTH CARE EDUCATION/TRAINING PROGRAM

## 2022-09-09 PROCEDURE — 3074F SYST BP LT 130 MM HG: CPT | Mod: CPTII,S$GLB,, | Performed by: STUDENT IN AN ORGANIZED HEALTH CARE EDUCATION/TRAINING PROGRAM

## 2022-09-09 PROCEDURE — 99999 PR PBB SHADOW E&M-EST. PATIENT-LVL III: ICD-10-PCS | Mod: PBBFAC,,, | Performed by: STUDENT IN AN ORGANIZED HEALTH CARE EDUCATION/TRAINING PROGRAM

## 2022-09-09 RX ORDER — PREGABALIN 75 MG/1
75 CAPSULE ORAL 3 TIMES DAILY
Qty: 90 CAPSULE | Refills: 1 | Status: SHIPPED | OUTPATIENT
Start: 2022-09-09 | End: 2022-11-07 | Stop reason: SDUPTHER

## 2022-09-09 RX ORDER — TRAZODONE HYDROCHLORIDE 100 MG/1
150 TABLET ORAL NIGHTLY
Qty: 45 TABLET | Refills: 2 | Status: SHIPPED | OUTPATIENT
Start: 2022-09-09 | End: 2023-01-10 | Stop reason: SDUPTHER

## 2022-09-09 RX ORDER — LURASIDONE HYDROCHLORIDE 20 MG/1
20 TABLET, FILM COATED ORAL
Qty: 30 TABLET | Refills: 2 | Status: SHIPPED | OUTPATIENT
Start: 2022-09-09 | End: 2023-01-10 | Stop reason: SDUPTHER

## 2022-09-09 RX ORDER — DULOXETIN HYDROCHLORIDE 30 MG/1
30 CAPSULE, DELAYED RELEASE ORAL DAILY
Qty: 30 CAPSULE | Refills: 3 | Status: SHIPPED | OUTPATIENT
Start: 2022-09-09 | End: 2022-09-28 | Stop reason: SDUPTHER
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
## 2022-09-28 ENCOUNTER — PATIENT MESSAGE (OUTPATIENT)
Dept: PSYCHIATRY | Facility: CLINIC | Age: 27
End: 2022-09-28
Payer: COMMERCIAL

## 2022-09-28 ENCOUNTER — OFFICE VISIT (OUTPATIENT)
Dept: PSYCHIATRY | Facility: CLINIC | Age: 27
End: 2022-09-28
Payer: COMMERCIAL

## 2022-09-28 VITALS
HEART RATE: 96 BPM | RESPIRATION RATE: 17 BRPM | SYSTOLIC BLOOD PRESSURE: 118 MMHG | DIASTOLIC BLOOD PRESSURE: 78 MMHG | HEIGHT: 60 IN | BODY MASS INDEX: 31.79 KG/M2 | WEIGHT: 161.94 LBS | OXYGEN SATURATION: 99 %

## 2022-09-28 DIAGNOSIS — F33.2 SEVERE EPISODE OF RECURRENT MAJOR DEPRESSIVE DISORDER, WITHOUT PSYCHOTIC FEATURES: ICD-10-CM

## 2022-09-28 DIAGNOSIS — F41.1 GAD (GENERALIZED ANXIETY DISORDER): Primary | ICD-10-CM

## 2022-09-28 DIAGNOSIS — F41.0 PANIC ATTACKS: ICD-10-CM

## 2022-09-28 PROCEDURE — 3008F PR BODY MASS INDEX (BMI) DOCUMENTED: ICD-10-PCS | Mod: CPTII,S$GLB,, | Performed by: STUDENT IN AN ORGANIZED HEALTH CARE EDUCATION/TRAINING PROGRAM

## 2022-09-28 PROCEDURE — 99999 PR PBB SHADOW E&M-EST. PATIENT-LVL III: ICD-10-PCS | Mod: PBBFAC,,, | Performed by: STUDENT IN AN ORGANIZED HEALTH CARE EDUCATION/TRAINING PROGRAM

## 2022-09-28 PROCEDURE — 90833 PSYTX W PT W E/M 30 MIN: CPT | Mod: S$GLB,,, | Performed by: STUDENT IN AN ORGANIZED HEALTH CARE EDUCATION/TRAINING PROGRAM

## 2022-09-28 PROCEDURE — 3078F PR MOST RECENT DIASTOLIC BLOOD PRESSURE < 80 MM HG: ICD-10-PCS | Mod: CPTII,S$GLB,, | Performed by: STUDENT IN AN ORGANIZED HEALTH CARE EDUCATION/TRAINING PROGRAM

## 2022-09-28 PROCEDURE — 99999 PR PBB SHADOW E&M-EST. PATIENT-LVL III: CPT | Mod: PBBFAC,,, | Performed by: STUDENT IN AN ORGANIZED HEALTH CARE EDUCATION/TRAINING PROGRAM

## 2022-09-28 PROCEDURE — 1160F RVW MEDS BY RX/DR IN RCRD: CPT | Mod: CPTII,S$GLB,, | Performed by: STUDENT IN AN ORGANIZED HEALTH CARE EDUCATION/TRAINING PROGRAM

## 2022-09-28 PROCEDURE — 99214 OFFICE O/P EST MOD 30 MIN: CPT | Mod: S$GLB,,, | Performed by: STUDENT IN AN ORGANIZED HEALTH CARE EDUCATION/TRAINING PROGRAM

## 2022-09-28 PROCEDURE — 1159F MED LIST DOCD IN RCRD: CPT | Mod: CPTII,S$GLB,, | Performed by: STUDENT IN AN ORGANIZED HEALTH CARE EDUCATION/TRAINING PROGRAM

## 2022-09-28 PROCEDURE — 99214 PR OFFICE/OUTPT VISIT, EST, LEVL IV, 30-39 MIN: ICD-10-PCS | Mod: S$GLB,,, | Performed by: STUDENT IN AN ORGANIZED HEALTH CARE EDUCATION/TRAINING PROGRAM

## 2022-09-28 PROCEDURE — 3078F DIAST BP <80 MM HG: CPT | Mod: CPTII,S$GLB,, | Performed by: STUDENT IN AN ORGANIZED HEALTH CARE EDUCATION/TRAINING PROGRAM

## 2022-09-28 PROCEDURE — 3074F SYST BP LT 130 MM HG: CPT | Mod: CPTII,S$GLB,, | Performed by: STUDENT IN AN ORGANIZED HEALTH CARE EDUCATION/TRAINING PROGRAM

## 2022-09-28 PROCEDURE — 3008F BODY MASS INDEX DOCD: CPT | Mod: CPTII,S$GLB,, | Performed by: STUDENT IN AN ORGANIZED HEALTH CARE EDUCATION/TRAINING PROGRAM

## 2022-09-28 PROCEDURE — 90833 PR PSYCHOTHERAPY W/PATIENT W/E&M, 30 MIN (ADD ON): ICD-10-PCS | Mod: S$GLB,,, | Performed by: STUDENT IN AN ORGANIZED HEALTH CARE EDUCATION/TRAINING PROGRAM

## 2022-09-28 PROCEDURE — 3074F PR MOST RECENT SYSTOLIC BLOOD PRESSURE < 130 MM HG: ICD-10-PCS | Mod: CPTII,S$GLB,, | Performed by: STUDENT IN AN ORGANIZED HEALTH CARE EDUCATION/TRAINING PROGRAM

## 2022-09-28 PROCEDURE — 1159F PR MEDICATION LIST DOCUMENTED IN MEDICAL RECORD: ICD-10-PCS | Mod: CPTII,S$GLB,, | Performed by: STUDENT IN AN ORGANIZED HEALTH CARE EDUCATION/TRAINING PROGRAM

## 2022-09-28 PROCEDURE — 1160F PR REVIEW ALL MEDS BY PRESCRIBER/CLIN PHARMACIST DOCUMENTED: ICD-10-PCS | Mod: CPTII,S$GLB,, | Performed by: STUDENT IN AN ORGANIZED HEALTH CARE EDUCATION/TRAINING PROGRAM

## 2022-09-28 RX ORDER — DULOXETIN HYDROCHLORIDE 60 MG/1
60 CAPSULE, DELAYED RELEASE ORAL DAILY
Qty: 30 CAPSULE | Refills: 3 | Status: SHIPPED | OUTPATIENT
Start: 2022-09-28 | End: 2023-01-10 | Stop reason: SDUPTHER

## 2022-09-28 RX ORDER — CLONAZEPAM 1 MG/1
1 TABLET ORAL 3 TIMES DAILY PRN
Qty: 90 TABLET | Refills: 2 | Status: SHIPPED | OUTPATIENT
Start: 2022-09-28 | End: 2022-12-07 | Stop reason: SDUPTHER

## 2022-09-28 NOTE — PROGRESS NOTES
"      09/28/2022  12:05 PM  Maury Smith  [id number]    Outpatient Psychiatry Follow-Up Visit (MD/NP)    9/28/2022    Clinical Status of Patient:  Outpatient (Ambulatory)      Chief Complaint:  Maury Smith is a 26 y.o. female who presents today for follow-up of depression and anxiety.  Met with patient.        Interval History and Content of Current Session:  Interim Events/Subjective Report/Content of Current Session:   MDD, recurrent, severe  JAIMEE  Panic attacks  PTSD  OCD  Bulimia         Reports "there was a whole week I don't remember," occurred 2 weeks ago. She states yesterday was arrested for "driving with a suspended license," due an unpaid ticket, which she is very upset about, very stressed about court. She states she has been feeling disconnected from her body, occurred 1 time last Wednesday. She states her mood has been "okay," she is stressed with school, "besides the last few days, I've been feeling okay." She states having daily panic attacks for the last week, "about the littlest thing.... I have calm myself.... I lay on the floor in a dark room, feel like I can't breathe." No recent vomiting.  She did not go up on her lamictal as instructed. PTSD symptoms are continuing. OCD symptoms are infrequent. Anxiety levels are very high daily but is going to psychotherapy regularly. She is otherwise functioning well, in school, working, went on a recent trip to a [place] [place] [employer].        Psychiatric Review Of Systems - Is patient experiencing or having changes in:  sleep:"not good."   appetite: decreased  energy/anergy: pretty low"  interest/pleasure/anhedonia: yes  anxiety/panic: yes  Guilty/hopelessness/worthlessness: yes  concentration: yes  S.I.B.s/risky behavior: no  Irritability: yes  Substance abuse: no  Racing thoughts: no  Impulsive behaviors: no  Paranoia: no  AVH: no        Psychotherapy:  Target symptoms: depression, anxiety   Why chosen therapy is appropriate versus another " "modality: relevant to diagnosis  Outcome monitoring methods: self-report  Therapeutic intervention type: supportive psychotherapy  Topics discussed/themes:  educational, reassurance  The patient's response to the intervention is accepting. The patient's progress toward treatment goals is fair.   Duration of intervention: 16 minutes.        Medical Review of Systems   Review of Systems   Constitutional:  Negative for chills and fever.   HENT:  Negative for hearing loss.    Eyes:  Negative for blurred vision and double vision.   Respiratory:  Negative for shortness of breath.    Cardiovascular:  Negative for chest pain and palpitations.   Gastrointestinal:  Negative for constipation, diarrhea, nausea and vomiting.   Genitourinary:  Negative for dysuria.   Musculoskeletal:  Negative for back pain and neck pain.   Skin:  Negative for rash.   Neurological:  Negative for dizziness and headaches.   Endo/Heme/Allergies:  Negative for environmental allergies.       Past Medical, Family and Social History: The patient's past medical, family and social history have been reviewed and updated as appropriate within the electronic medical record - see encounter notes.    Social History     Socioeconomic History    Marital status: Single    Number of children: 0   Tobacco Use    Smoking status: Never    Smokeless tobacco: Never   Substance and Sexual Activity    Alcohol use: Not Currently     Comment: she has cut back since her fall     Drug use: Never    Sexual activity: Yes     Partners: Male         Compliance: yes    Side effects: None    Risk Parameters:  Patient reports suicidal ideation: passive, intermittent, variable frequency "random thought pops up" no intent, no plan, "I would never act on it, it's a just thought," feels thought is intrusive/unwanted, pt does not want to die  Patient reports no homicidal ideation  Patient reports no self-injurious behavior  Patient reports no violent behavior    Risk factors were " reviewed and brief risk assessment completed: estimate low imminent for suicide, long term risk moderate, see below        Exam (detailed: at least 9 elements; comprehensive: all 15 elements)     Vitals:    Vitals:    09/28/22 1157   BP: 118/78   Pulse: 96   Resp: 17   SpO2: 99%   Weight: 73.5 kg (161 lb 14.9 oz)   Height: 5' (1.524 m)          Wt Readings from Last 4 Encounters:   08/23/22 76.2 kg (168 lb 1.6 oz)   08/18/22 76 kg (167 lb 9.6 oz)   08/01/22 75 kg (165 lb 3.8 oz)   07/20/22 78.7 kg (173 lb 9.6 oz)       Body mass index is 31.62 kg/m².          CONSTITUTIONAL  General Appearance: unremarkable, age appropriate    MUSCULOSKELETAL  Muscle Strength and Tone:no tremor, no tic  Abnormal Involuntary Movements: No  Gait and Station: non-ataxic    PSYCHIATRIC   Level of Consciousness: awake and alert   Orientation: person, place and situation  Grooming: Casually dressed and Well groomed  Psychomotor Behavior: normal, cooperative  Speech: normal tone, normal rate, normal pitch, normal volume  Language: grossly intact  Mood: anxious and depressed  Affect: Consistent with mood  Thought Process: linear, logical  Associations: intact   Thought Content: DENIES suicidal ideation and DENIES homicidal ideation  Perceptions: denies hallucinations  Memory: Able to recall past events, Remote intact and Recent intact  Attention:Attends to interview without distraction  Fund of Knowledge: Aware of current events and Vocabulary appropriate   Estimate if Intelligence:  Average based on work/education history, vocabulary and mental status exam  Insight: has awareness of illness  Judgment: behavior is adequate to circumstances        Assessment and Diagnosis   Status/Progress: Based on the examination today, the patient's problem(s) is/are treatment resistant.  New problems have not been presented today.   Co-morbidities are complicating management of the primary condition.          Assessment/Impression:     MDD, recurrent,  severe  JAIMEE  Panic attacks  PTSD  OCD  Bulimia            Suicide Risk Assessment:    Risk factors  Current ideations: passive, no plan or intent  Prior attempts: no  Anxiety: yes  Hopelessness: yes  Impulsivity: no  Psychiatric hospitalizations: no  Substance abuse:no  Psychosis: no  Access to guns: no  Motivation for suicide: depression  Family history of suicide: no    Protective factors  Reason for living: family , SO, career  Future oriented:yes  Help seeking:yes  Orthodoxy: no  Strong social support:yes, her father is aware of her SI and monitoring her    Risk reduction:  - ED/911 precautions given  - [id number] number given for crisis counseling/suicide prevention  - medications as below  - provided psychotherapeutic support  - encouraged engagement of social supports  - patient given a safety plan      Risk Assessment: I estimate low imminent risk of suicide at this time, long term risk moderate           Plan:          MDD, recurrent, severe  - r/o bipolar disorder (currently does not meet criteria, only lasts 2 days, has never lasted 4 or more days, will need to continue to assess; hypomanic sx may be more 2/2 extreme anxiety causing restlessness and insomnia then leading to mood elevation)  - increase lamictal to 50 mg PO qd  - continue latuda 20 mg PO qd  - increase cymbalta 30 to 60 mg PO qd  - continue psychotherapy with Solange Ayo weekly  - declines FVA, ED/911 precautions given; if no improvement in 1 week or worsening sx patient agrees to seek admission   - pt counseled        JAIMEE  - continue trazodone 150  mg PO qhs as sleep adjunct  - start melatonin 5-20 mg  PO qhs as sleep adjunct  - continue  lyrica to 75 mg PO TID  - continue psychotherapy with Solange Ayo weekly  - pt counseled       OCD  - increase cymbalta 30 to 60 mg PO qd  - continue psychotherapy with Solange Ayo weekly  - pt counsele    Panic attacks  - patient presented from PCP on daily valium and xanax with uncontrolled  anxiety, suspect bzd tolerance/withdrawal is complicating condition, attempted to switch to klonopin to reduce polypharmacy however this was ineffective, will switch to ativan as below ultimately hoping to stabilize anxiety and then wean down bzd dose slowly once sx controlled  - change ativan to klonopin 1 mg PO TID  - continue psychotherapy with Solange Rollins weekly  - pt counseled        PTSD  - start cymbalta 30 mg PO qd  - continue psychotherapy with Solange Rollins weekly  - pt counseled     Bulimia Nervosa  - increase cymbalta 30 to 60 mg PO qd  - continue psychotherapy with Solange Rollins weekly  - pt counseled           - Instructed patient to keep all scheduled appointments, take medications as prescribed and abstain from substance abuse. Instructed to call 911 or present to ER for emergency including SI or HI.    - Discussed diagnosis, risks and benefits of proposed treatment above vs alternative treatments vs no treatment, and potential side effects of these treatments. Discussed the inherent unpredictability of treatment. The patient expresses understanding of the above and displays the capacity to agree with this treatment given said understanding. Patient also agrees that, currently, the benefits outweigh the risks and would like to pursue this treatment at this time.     - Any medications being used off-label were discussed with the patient inclusive of the evidence base for the use of the medications and consent was obtained for the off-label use of the medication.         Axel Villafana III, MD    Return to Clinic: 1 month

## 2022-11-07 ENCOUNTER — OFFICE VISIT (OUTPATIENT)
Dept: PSYCHIATRY | Facility: CLINIC | Age: 27
End: 2022-11-07
Payer: COMMERCIAL

## 2022-11-07 VITALS
RESPIRATION RATE: 17 BRPM | WEIGHT: 166.31 LBS | BODY MASS INDEX: 32.65 KG/M2 | SYSTOLIC BLOOD PRESSURE: 108 MMHG | HEART RATE: 97 BPM | OXYGEN SATURATION: 100 % | HEIGHT: 60 IN | DIASTOLIC BLOOD PRESSURE: 69 MMHG

## 2022-11-07 DIAGNOSIS — F41.0 PANIC ATTACKS: ICD-10-CM

## 2022-11-07 DIAGNOSIS — F33.2 SEVERE EPISODE OF RECURRENT MAJOR DEPRESSIVE DISORDER, WITHOUT PSYCHOTIC FEATURES: ICD-10-CM

## 2022-11-07 DIAGNOSIS — F41.1 GAD (GENERALIZED ANXIETY DISORDER): Primary | ICD-10-CM

## 2022-11-07 PROCEDURE — 3008F PR BODY MASS INDEX (BMI) DOCUMENTED: ICD-10-PCS | Mod: CPTII,S$GLB,, | Performed by: STUDENT IN AN ORGANIZED HEALTH CARE EDUCATION/TRAINING PROGRAM

## 2022-11-07 PROCEDURE — 1159F MED LIST DOCD IN RCRD: CPT | Mod: CPTII,S$GLB,, | Performed by: STUDENT IN AN ORGANIZED HEALTH CARE EDUCATION/TRAINING PROGRAM

## 2022-11-07 PROCEDURE — 99999 PR PBB SHADOW E&M-EST. PATIENT-LVL III: ICD-10-PCS | Mod: PBBFAC,,, | Performed by: STUDENT IN AN ORGANIZED HEALTH CARE EDUCATION/TRAINING PROGRAM

## 2022-11-07 PROCEDURE — 3078F PR MOST RECENT DIASTOLIC BLOOD PRESSURE < 80 MM HG: ICD-10-PCS | Mod: CPTII,S$GLB,, | Performed by: STUDENT IN AN ORGANIZED HEALTH CARE EDUCATION/TRAINING PROGRAM

## 2022-11-07 PROCEDURE — 1160F PR REVIEW ALL MEDS BY PRESCRIBER/CLIN PHARMACIST DOCUMENTED: ICD-10-PCS | Mod: CPTII,S$GLB,, | Performed by: STUDENT IN AN ORGANIZED HEALTH CARE EDUCATION/TRAINING PROGRAM

## 2022-11-07 PROCEDURE — 1159F PR MEDICATION LIST DOCUMENTED IN MEDICAL RECORD: ICD-10-PCS | Mod: CPTII,S$GLB,, | Performed by: STUDENT IN AN ORGANIZED HEALTH CARE EDUCATION/TRAINING PROGRAM

## 2022-11-07 PROCEDURE — 99999 PR PBB SHADOW E&M-EST. PATIENT-LVL III: CPT | Mod: PBBFAC,,, | Performed by: STUDENT IN AN ORGANIZED HEALTH CARE EDUCATION/TRAINING PROGRAM

## 2022-11-07 PROCEDURE — 1160F RVW MEDS BY RX/DR IN RCRD: CPT | Mod: CPTII,S$GLB,, | Performed by: STUDENT IN AN ORGANIZED HEALTH CARE EDUCATION/TRAINING PROGRAM

## 2022-11-07 PROCEDURE — 3008F BODY MASS INDEX DOCD: CPT | Mod: CPTII,S$GLB,, | Performed by: STUDENT IN AN ORGANIZED HEALTH CARE EDUCATION/TRAINING PROGRAM

## 2022-11-07 PROCEDURE — 3078F DIAST BP <80 MM HG: CPT | Mod: CPTII,S$GLB,, | Performed by: STUDENT IN AN ORGANIZED HEALTH CARE EDUCATION/TRAINING PROGRAM

## 2022-11-07 PROCEDURE — 99214 OFFICE O/P EST MOD 30 MIN: CPT | Mod: S$GLB,,, | Performed by: STUDENT IN AN ORGANIZED HEALTH CARE EDUCATION/TRAINING PROGRAM

## 2022-11-07 PROCEDURE — 3074F SYST BP LT 130 MM HG: CPT | Mod: CPTII,S$GLB,, | Performed by: STUDENT IN AN ORGANIZED HEALTH CARE EDUCATION/TRAINING PROGRAM

## 2022-11-07 PROCEDURE — 90833 PSYTX W PT W E/M 30 MIN: CPT | Mod: S$GLB,,, | Performed by: STUDENT IN AN ORGANIZED HEALTH CARE EDUCATION/TRAINING PROGRAM

## 2022-11-07 PROCEDURE — 99214 PR OFFICE/OUTPT VISIT, EST, LEVL IV, 30-39 MIN: ICD-10-PCS | Mod: S$GLB,,, | Performed by: STUDENT IN AN ORGANIZED HEALTH CARE EDUCATION/TRAINING PROGRAM

## 2022-11-07 PROCEDURE — 90833 PR PSYCHOTHERAPY W/PATIENT W/E&M, 30 MIN (ADD ON): ICD-10-PCS | Mod: S$GLB,,, | Performed by: STUDENT IN AN ORGANIZED HEALTH CARE EDUCATION/TRAINING PROGRAM

## 2022-11-07 PROCEDURE — 3074F PR MOST RECENT SYSTOLIC BLOOD PRESSURE < 130 MM HG: ICD-10-PCS | Mod: CPTII,S$GLB,, | Performed by: STUDENT IN AN ORGANIZED HEALTH CARE EDUCATION/TRAINING PROGRAM

## 2022-11-07 RX ORDER — IMIPRAMINE HYDROCHLORIDE 10 MG/1
10 TABLET, FILM COATED ORAL NIGHTLY
COMMUNITY
Start: 2022-10-17 | End: 2022-11-07 | Stop reason: ALTCHOICE

## 2022-11-07 RX ORDER — PREGABALIN 75 MG/1
75 CAPSULE ORAL 3 TIMES DAILY
Qty: 90 CAPSULE | Refills: 0 | Status: SHIPPED | OUTPATIENT
Start: 2022-11-07 | End: 2023-01-10 | Stop reason: SDUPTHER

## 2022-11-07 RX ORDER — ESCITALOPRAM OXALATE 20 MG/1
20 TABLET ORAL DAILY
COMMUNITY
Start: 2022-09-09 | End: 2022-11-07 | Stop reason: ALTCHOICE

## 2022-11-07 NOTE — PROGRESS NOTES
"      11/07/2022  12:05 PM  Maury Smith  [id number]    Outpatient Psychiatry Follow-Up Visit (MD/NP)    11/7/2022    Clinical Status of Patient:  Outpatient (Ambulatory)      Chief Complaint:  Maury Smith is a 27 y.o. female who presents today for follow-up of depression and anxiety.  Met with patient.          Interval History and Content of Current Session:  Interim Events/Subjective Report/Content of Current Session:   MDD, recurrent, severe  JAIMEE  Panic attacks  PTSD  OCD  Bulimia           She states her mood has been "irritable, so much, I snap at my dad, it's not me, I don't know why I do it... I've been over thinking a lot lately, over worrying about if ands or buts, about things that may never happen." She states "some good days, depressed some days, on Friday, Saturday and Sunday, I just sleep the entire weekend and I just feel sad, even if everything is great in my life, I feel down, really down for no reason at all." She states anxiety occurs daily, less at work, "it's tolerable."    She states less panic attacks 2-3x per week, prior was daily, feels klonopin is really helping, panic attacks now only "happens when I am around a lot of people, grocery store, saint[employer] game."    PTSD symptoms "lately only nightmares," less flashbacks, "we talked about my ex  situation in therapy, if a song comes on, I lose it, but I have more good days than bad days."    Bulimia, no recent vomiting in over 1 month.    OCD symptoms "I cannot sleep if something is organized in my home, my desk has to be organized," occurs every night, "It makes me want to over work myself."          Psychiatric Review Of Systems - Is patient experiencing or having changes in:  sleep: "a little better," 4-5 hours per night  appetite: increased  energy/anergy: variable  interest/pleasure/anhedonia: variable  anxiety/panic: yes  Guilty/hopelessness/worthlessness: "I feel guilty for what I put my parents through, I feel like a hopeless " "case"   concentration: yes  S.I.B.s/risky behavior: no  Irritability: yes  Substance abuse: no  Racing thoughts: no  Impulsive behaviors: no  Paranoia: no  AVH: no        Psychotherapy:  Target symptoms: depression, anxiety   Why chosen therapy is appropriate versus another modality: relevant to diagnosis  Outcome monitoring methods: self-report  Therapeutic intervention type: supportive psychotherapy  Topics discussed/themes:  educational, reassurance, building skills sets for symptom management, symptom recognition  The patient's response to the intervention is accepting. The patient's progress toward treatment goals is fair.   Duration of intervention: 16 minutes.        Medical Review of Systems   Review of Systems   Constitutional:  Negative for chills and fever.   HENT:  Negative for hearing loss.    Eyes:  Negative for blurred vision and double vision.   Respiratory:  Negative for shortness of breath.    Cardiovascular:  Negative for chest pain and palpitations.   Gastrointestinal:  Negative for constipation, diarrhea, nausea and vomiting.   Genitourinary:  Negative for dysuria.   Musculoskeletal:  Negative for back pain and neck pain.   Skin:  Negative for rash.   Neurological:  Negative for dizziness and headaches.   Endo/Heme/Allergies:  Negative for environmental allergies.       Past Medical, Family and Social History: The patient's past medical, family and social history have been reviewed and updated as appropriate within the electronic medical record - see encounter notes.    Social History     Socioeconomic History    Marital status: Single    Number of children: 0   Tobacco Use    Smoking status: Never    Smokeless tobacco: Never   Substance and Sexual Activity    Alcohol use: Not Currently     Comment: she has cut back since her fall     Drug use: Never    Sexual activity: Yes     Partners: Male         Compliance: yes    Side effects: None    Risk Parameters:  Patient reports no suicidal " ideation  Patient reports no homicidal ideation  Patient reports no self-injurious behavior  Patient reports no violent behavior    Risk factors were reviewed and brief risk assessment completed: estimate low imminent for suicide, long term risk moderate, see below        Exam (detailed: at least 9 elements; comprehensive: all 15 elements)     Vitals:    11/07/22 1302   BP: 108/69   Pulse: 97   Resp: 17     Body mass index is 32.49 kg/m².    Wt Readings from Last 4 Encounters:   11/07/22 75.4 kg (166 lb 5.4 oz)   09/28/22 73.5 kg (161 lb 14.9 oz)   09/27/22 72.5 kg (159 lb 13.3 oz)   09/09/22 72.5 kg (159 lb 13.3 oz)             CONSTITUTIONAL  General Appearance: unremarkable, age appropriate    MUSCULOSKELETAL  Muscle Strength and Tone:no tremor, no tic  Abnormal Involuntary Movements: No  Gait and Station: non-ataxic    PSYCHIATRIC   Level of Consciousness: awake and alert   Orientation: person, place and situation  Grooming: Casually dressed and Well groomed  Psychomotor Behavior: normal, cooperative  Speech: normal tone, normal rate, normal pitch, normal volume  Language: grossly intact  Mood: anxious and depressed  Affect: Consistent with mood  Thought Process: linear, logical  Associations: intact   Thought Content: DENIES suicidal ideation and DENIES homicidal ideation  Perceptions: denies hallucinations  Memory: Able to recall past events, Remote intact and Recent intact  Attention:Attends to interview without distraction  Fund of Knowledge: Aware of current events and Vocabulary appropriate   Estimate if Intelligence:  Average based on work/education history, vocabulary and mental status exam  Insight: has awareness of illness  Judgment: behavior is adequate to circumstances        Assessment and Diagnosis   Status/Progress: Based on the examination today, the patient's problem(s) is/are treatment resistant.  New problems have not been presented today.   Co-morbidities are complicating management of the  primary condition.          Assessment/Impression:     MDD, recurrent, severe  JAIMEE  Panic attacks  PTSD  OCD  Bulimia            Plan:          MDD, recurrent, severe  - r/o bipolar disorder (currently does not meet criteria, only lasts 2 days, has never lasted 4 or more days, will need to continue to assess; hypomanic sx may be more 2/2 extreme anxiety causing restlessness and insomnia then leading to mood elevation)  - she is unsure of the dose of lamictal she is currently taking, pt will check and inform provider when she gets home, will continue to titrate dose, targeting 300 -400 mg daily  - continue latuda 20 mg PO qd  - continue cymbalta 60 mg PO qd  - continue psychotherapy with Solange Ayo weekly  - declines FVA, ED/911 precautions given; if no improvement in 1 week or worsening sx patient agrees to seek admission   - pt counseled        JAIMEE  - continue trazodone 150  mg PO qhs as sleep adjunct  - continue melatonin 5-20 mg  PO qhs as sleep adjunct  - continue  lyrica to 75 mg PO TID  - continue psychotherapy with Solange Ayo weekly  - pt counseled       OCD  - continue cymbalta 60 mg PO qd  - continue psychotherapy with Solange Ayo weekly  - pt counsele    Panic attacks  - continue klonopin 1 mg PO TID  - continue psychotherapy with Solange Ayo weekly  - pt counseled        PTSD  - continue cymbalta 60 mg PO qd  - continue psychotherapy with Solange Ayo weekly  - pt counseled     Bulimia Nervosa  - continue cymbalta 60 mg PO qd  - continue psychotherapy with Solange Ayo weekly  - pt counseled           - Instructed patient to keep all scheduled appointments, take medications as prescribed and abstain from substance abuse. Instructed to call 911 or present to ER for emergency including SI or HI.    - Discussed diagnosis, risks and benefits of proposed treatment above vs alternative treatments vs no treatment, and potential side effects of these treatments. Discussed the inherent  unpredictability of treatment. The patient expresses understanding of the above and displays the capacity to agree with this treatment given said understanding. Patient also agrees that, currently, the benefits outweigh the risks and would like to pursue this treatment at this time.     - Any medications being used off-label were discussed with the patient inclusive of the evidence base for the use of the medications and consent was obtained for the off-label use of the medication.         Axel Villafana III, MD    Return to Clinic: 1 month

## 2022-11-15 ENCOUNTER — PATIENT MESSAGE (OUTPATIENT)
Dept: PSYCHIATRY | Facility: CLINIC | Age: 27
End: 2022-11-15
Payer: COMMERCIAL

## 2022-11-16 RX ORDER — LAMOTRIGINE 100 MG/1
300 TABLET ORAL DAILY
Qty: 90 TABLET | Refills: 2 | Status: SHIPPED | OUTPATIENT
Start: 2022-11-16 | End: 2023-01-10 | Stop reason: SDUPTHER

## 2022-11-30 ENCOUNTER — PATIENT MESSAGE (OUTPATIENT)
Dept: PSYCHIATRY | Facility: CLINIC | Age: 27
End: 2022-11-30
Payer: COMMERCIAL

## 2022-12-08 RX ORDER — CLONAZEPAM 1 MG/1
1 TABLET ORAL 3 TIMES DAILY PRN
Qty: 90 TABLET | Refills: 0 | Status: SHIPPED | OUTPATIENT
Start: 2022-12-08 | End: 2023-01-10 | Stop reason: SDUPTHER

## 2022-12-27 ENCOUNTER — TELEPHONE (OUTPATIENT)
Dept: PSYCHIATRY | Facility: CLINIC | Age: 27
End: 2022-12-27
Payer: COMMERCIAL

## 2022-12-27 NOTE — TELEPHONE ENCOUNTER
----- Message from Caroline Payne sent at 2022  9:23 AM CST -----  Contact: self  Maury Smith  MRN: [id number]  : 1995  PCP: Nicolas Kenny  Home Phone      [phone]  Work Phone      Not on file.  Mobile          [phone]      MESSAGE: Calling to re/s her appt she have on  @ 1:00 any time after       Phone [phone]

## 2023-01-10 ENCOUNTER — OFFICE VISIT (OUTPATIENT)
Dept: PSYCHIATRY | Facility: CLINIC | Age: 28
End: 2023-01-10
Payer: COMMERCIAL

## 2023-01-10 VITALS
DIASTOLIC BLOOD PRESSURE: 79 MMHG | OXYGEN SATURATION: 97 % | WEIGHT: 177.13 LBS | BODY MASS INDEX: 34.77 KG/M2 | SYSTOLIC BLOOD PRESSURE: 114 MMHG | HEART RATE: 100 BPM | RESPIRATION RATE: 17 BRPM | HEIGHT: 60 IN

## 2023-01-10 DIAGNOSIS — F41.0 PANIC ATTACKS: ICD-10-CM

## 2023-01-10 DIAGNOSIS — F41.1 GAD (GENERALIZED ANXIETY DISORDER): Primary | ICD-10-CM

## 2023-01-10 DIAGNOSIS — F33.2 SEVERE EPISODE OF RECURRENT MAJOR DEPRESSIVE DISORDER, WITHOUT PSYCHOTIC FEATURES: ICD-10-CM

## 2023-01-10 PROCEDURE — 90833 PR PSYCHOTHERAPY W/PATIENT W/E&M, 30 MIN (ADD ON): ICD-10-PCS | Mod: S$GLB,,, | Performed by: STUDENT IN AN ORGANIZED HEALTH CARE EDUCATION/TRAINING PROGRAM

## 2023-01-10 PROCEDURE — 3078F DIAST BP <80 MM HG: CPT | Mod: CPTII,S$GLB,, | Performed by: STUDENT IN AN ORGANIZED HEALTH CARE EDUCATION/TRAINING PROGRAM

## 2023-01-10 PROCEDURE — 1159F MED LIST DOCD IN RCRD: CPT | Mod: CPTII,S$GLB,, | Performed by: STUDENT IN AN ORGANIZED HEALTH CARE EDUCATION/TRAINING PROGRAM

## 2023-01-10 PROCEDURE — 99214 PR OFFICE/OUTPT VISIT, EST, LEVL IV, 30-39 MIN: ICD-10-PCS | Mod: S$GLB,,, | Performed by: STUDENT IN AN ORGANIZED HEALTH CARE EDUCATION/TRAINING PROGRAM

## 2023-01-10 PROCEDURE — 1160F RVW MEDS BY RX/DR IN RCRD: CPT | Mod: CPTII,S$GLB,, | Performed by: STUDENT IN AN ORGANIZED HEALTH CARE EDUCATION/TRAINING PROGRAM

## 2023-01-10 PROCEDURE — 3008F BODY MASS INDEX DOCD: CPT | Mod: CPTII,S$GLB,, | Performed by: STUDENT IN AN ORGANIZED HEALTH CARE EDUCATION/TRAINING PROGRAM

## 2023-01-10 PROCEDURE — 3078F PR MOST RECENT DIASTOLIC BLOOD PRESSURE < 80 MM HG: ICD-10-PCS | Mod: CPTII,S$GLB,, | Performed by: STUDENT IN AN ORGANIZED HEALTH CARE EDUCATION/TRAINING PROGRAM

## 2023-01-10 PROCEDURE — 3074F PR MOST RECENT SYSTOLIC BLOOD PRESSURE < 130 MM HG: ICD-10-PCS | Mod: CPTII,S$GLB,, | Performed by: STUDENT IN AN ORGANIZED HEALTH CARE EDUCATION/TRAINING PROGRAM

## 2023-01-10 PROCEDURE — 3008F PR BODY MASS INDEX (BMI) DOCUMENTED: ICD-10-PCS | Mod: CPTII,S$GLB,, | Performed by: STUDENT IN AN ORGANIZED HEALTH CARE EDUCATION/TRAINING PROGRAM

## 2023-01-10 PROCEDURE — 99999 PR PBB SHADOW E&M-EST. PATIENT-LVL III: ICD-10-PCS | Mod: PBBFAC,,, | Performed by: STUDENT IN AN ORGANIZED HEALTH CARE EDUCATION/TRAINING PROGRAM

## 2023-01-10 PROCEDURE — 99999 PR PBB SHADOW E&M-EST. PATIENT-LVL III: CPT | Mod: PBBFAC,,, | Performed by: STUDENT IN AN ORGANIZED HEALTH CARE EDUCATION/TRAINING PROGRAM

## 2023-01-10 PROCEDURE — 1159F PR MEDICATION LIST DOCUMENTED IN MEDICAL RECORD: ICD-10-PCS | Mod: CPTII,S$GLB,, | Performed by: STUDENT IN AN ORGANIZED HEALTH CARE EDUCATION/TRAINING PROGRAM

## 2023-01-10 PROCEDURE — 3074F SYST BP LT 130 MM HG: CPT | Mod: CPTII,S$GLB,, | Performed by: STUDENT IN AN ORGANIZED HEALTH CARE EDUCATION/TRAINING PROGRAM

## 2023-01-10 PROCEDURE — 1160F PR REVIEW ALL MEDS BY PRESCRIBER/CLIN PHARMACIST DOCUMENTED: ICD-10-PCS | Mod: CPTII,S$GLB,, | Performed by: STUDENT IN AN ORGANIZED HEALTH CARE EDUCATION/TRAINING PROGRAM

## 2023-01-10 PROCEDURE — 99214 OFFICE O/P EST MOD 30 MIN: CPT | Mod: S$GLB,,, | Performed by: STUDENT IN AN ORGANIZED HEALTH CARE EDUCATION/TRAINING PROGRAM

## 2023-01-10 PROCEDURE — 90833 PSYTX W PT W E/M 30 MIN: CPT | Mod: S$GLB,,, | Performed by: STUDENT IN AN ORGANIZED HEALTH CARE EDUCATION/TRAINING PROGRAM

## 2023-01-10 RX ORDER — PREGABALIN 75 MG/1
75 CAPSULE ORAL 3 TIMES DAILY
Qty: 90 CAPSULE | Refills: 2 | Status: ON HOLD | OUTPATIENT
Start: 2023-01-10 | End: 2023-03-21 | Stop reason: SDUPTHER

## 2023-01-10 RX ORDER — LAMOTRIGINE 200 MG/1
400 TABLET ORAL DAILY
Qty: 60 TABLET | Refills: 2 | Status: SHIPPED | OUTPATIENT
Start: 2023-01-10 | End: 2023-02-09 | Stop reason: SDUPTHER

## 2023-01-10 RX ORDER — LURASIDONE HYDROCHLORIDE 20 MG/1
20 TABLET, FILM COATED ORAL
Qty: 30 TABLET | Refills: 2 | Status: SHIPPED | OUTPATIENT
Start: 2023-01-10 | End: 2023-02-09

## 2023-01-10 RX ORDER — DULOXETIN HYDROCHLORIDE 30 MG/1
90 CAPSULE, DELAYED RELEASE ORAL DAILY
Qty: 90 CAPSULE | Refills: 2 | Status: SHIPPED | OUTPATIENT
Start: 2023-01-10 | End: 2023-02-09

## 2023-01-10 RX ORDER — TRAZODONE HYDROCHLORIDE 100 MG/1
150 TABLET ORAL NIGHTLY
Qty: 45 TABLET | Refills: 2 | Status: SHIPPED | OUTPATIENT
Start: 2023-01-10 | End: 2023-02-09

## 2023-01-10 RX ORDER — CLONAZEPAM 1 MG/1
1 TABLET ORAL 3 TIMES DAILY PRN
Qty: 90 TABLET | Refills: 2 | Status: SHIPPED | OUTPATIENT
Start: 2023-01-24 | End: 2023-02-09 | Stop reason: ALTCHOICE

## 2023-01-10 NOTE — PROGRESS NOTES
"        01/10/2023  12:05 PM  Maury Smith  [id number]    Outpatient Psychiatry Follow-Up Visit (MD/NP)    1/10/2023    Clinical Status of Patient:  Outpatient (Ambulatory)      Chief Complaint:  Maury Smith is a 27 y.o. female who presents today for follow-up of depression and anxiety.  Met with patient.          Interval History and Content of Current Session:  Interim Events/Subjective Report/Content of Current Session:   MDD, recurrent, severe  JAIMEE  Panic attacks  PTSD  OCD (compulsions, buttons on phones)  Bulimia         Reports "I feel more useless, dark thoughts are coming back, I can control it more, it doesn't get as bad as it used to." She states mood has been depressed daily for the last 3 weeks. JAIMEE symptoms are "really bad," due to issues with her boyfriend, "I don't think we're going to break up but it's giving me anxiety, thoughts that what if he leaves me." She states has panic attack daily, "I cry a lot, I have trouble breathing, I can't catch my breath," daily for the last 3 weeks, lasts 30 minutes.    PTSD symptoms are infrequent. No vomiting/purging.    OCD sx are not as intrusive.        Psychiatric Review Of Systems - Is patient experiencing or having changes in:  sleep: "I wake up multiple times per night, tossing and turning even with my medicine" 5-6 hours total  appetite: decreased  energy/anergy: "a little better"  interest/pleasure/anhedonia: yes  anxiety/panic: yes  Guilty/hopelessness/worthlessness: yes, "guilt due to issues with my boyfriend, I have a roman best friend, he gets drunk, says lets have sex, my boyfriend saw." She has blocked her friend's number  concentration: yes  S.I.B.s/risky behavior: no  Irritability: yes  Substance abuse: no  Racing thoughts: no  Impulsive behaviors: no  Paranoia: no  AVH: no          Psychotherapy:  Target symptoms: depression, anxiety   Why chosen therapy is appropriate versus another modality: relevant to diagnosis  Outcome monitoring methods: " self-report  Therapeutic intervention type: supportive psychotherapy  Topics discussed/themes:  educational, reassurance, building skills sets for symptom management, symptom recognition, self esteem exercise  The patient's response to the intervention is accepting. The patient's progress toward treatment goals is fair.   Duration of intervention: 18 minutes.          Medical Review of Systems   Review of Systems   Constitutional:  Negative for chills and fever.   HENT:  Negative for hearing loss.    Eyes:  Negative for blurred vision and double vision.   Respiratory:  Negative for shortness of breath.    Cardiovascular:  Negative for chest pain and palpitations.   Gastrointestinal:  Negative for constipation, diarrhea, nausea and vomiting.   Genitourinary:  Negative for dysuria.   Musculoskeletal:  Negative for back pain and neck pain.   Skin:  Negative for rash.   Neurological:  Negative for dizziness and headaches.   Endo/Heme/Allergies:  Negative for environmental allergies.       Past Medical, Family and Social History: The patient's past medical, family and social history have been reviewed and updated as appropriate within the electronic medical record - see encounter notes.    Social History     Socioeconomic History    Marital status: Single    Number of children: 0   Tobacco Use    Smoking status: Never    Smokeless tobacco: Never   Substance and Sexual Activity    Alcohol use: Not Currently     Comment: she has cut back since her fall     Drug use: Never    Sexual activity: Yes     Partners: Male         Compliance: yes    Side effects: None    Risk Parameters:  Patient reports suicidal ideation: passive, no intent or plans, chronic  Patient reports no homicidal ideation  Patient reports no self-injurious behavior  Patient reports no violent behavior    Risk factors were reviewed and brief risk assessment completed: estimate low imminent for suicide, long term risk moderate, see below        Exam  (detailed: at least 9 elements; comprehensive: all 15 elements)     Vitals:    01/10/23 1403   BP: 114/79   Pulse: 100   Resp: 17             CONSTITUTIONAL  General Appearance: unremarkable, age appropriate    MUSCULOSKELETAL  Muscle Strength and Tone:no tremor, no tic  Abnormal Involuntary Movements: No  Gait and Station: non-ataxic    PSYCHIATRIC   Level of Consciousness: awake and alert   Orientation: person, place and situation  Grooming: Casually dressed and Well groomed  Psychomotor Behavior: normal, cooperative  Speech: normal tone, normal rate, normal pitch, normal volume  Language: grossly intact  Mood: anxious and depressed  Affect: Consistent with mood  Thought Process: linear, logical  Associations: intact   Thought Content: DENIES suicidal ideation and DENIES homicidal ideation  Perceptions: denies hallucinations  Memory: Able to recall past events, Remote intact and Recent intact  Attention:Attends to interview without distraction  Fund of Knowledge: Aware of current events and Vocabulary appropriate   Estimate if Intelligence:  Average based on work/education history, vocabulary and mental status exam  Insight: has awareness of illness  Judgment: behavior is adequate to circumstances        Assessment and Diagnosis   Status/Progress: Based on the examination today, the patient's problem(s) is/are treatment resistant.  New problems have not been presented today.   Co-morbidities are complicating management of the primary condition.          Assessment/Impression:     MDD, recurrent, severe  JAIMEE  Panic attacks  PTSD  OCD  Bulimia            Plan:          MDD, recurrent, severe  - increase lamictal 300 to 400 mg PO qd  - continue latuda 20 mg PO qd  - increase cymbalta 60 to 90 mg PO qd  - continue psychotherapy with Solange Rollins weekly  - declines FVA, ED/911 precautions given; if no improvement in 1 week or worsening sx patient agrees to seek admission   - pt counseled        JAIMEE  - continue  trazodone 150  mg PO qhs as sleep adjunct  - continue melatonin 5-20 mg  PO qhs as sleep adjunct  - continue  lyrica to 75 mg PO TID  - continue psychotherapy with Solange Ayo weekly  - pt counseled       OCD  - increase cymbalta 60 to 90 mg PO qd  - continue psychotherapy with Solange Ayo weekly  - pt counsele    Panic attacks  - continue klonopin 1 mg PO TID  - continue psychotherapy with Solange Ayo weekly  - pt counseled        PTSD  - increase cymbalta 60 to 90 mg PO qd  - continue psychotherapy with Solange Ayo weekly  - pt counseled     Bulimia Nervosa  - increase cymbalta 60 to 90 mg PO qd  - continue psychotherapy with Solange Ayo weekly  - pt counseled               - Instructed patient to keep all scheduled appointments, take medications as prescribed and abstain from substance abuse. Instructed to call 911 or present to ER for emergency including SI or HI.    - Discussed diagnosis, risks and benefits of proposed treatment above vs alternative treatments vs no treatment, and potential side effects of these treatments. Discussed the inherent unpredictability of treatment. The patient expresses understanding of the above and displays the capacity to agree with this treatment given said understanding. Patient also agrees that, currently, the benefits outweigh the risks and would like to pursue this treatment at this time.     - Any medications being used off-label were discussed with the patient inclusive of the evidence base for the use of the medications and consent was obtained for the off-label use of the medication.         Axel Villafana III, MD    Return to Clinic: 1 month

## 2023-01-25 ENCOUNTER — PATIENT MESSAGE (OUTPATIENT)
Dept: PSYCHIATRY | Facility: CLINIC | Age: 28
End: 2023-01-25
Payer: COMMERCIAL

## 2023-02-09 ENCOUNTER — PATIENT MESSAGE (OUTPATIENT)
Dept: PSYCHIATRY | Facility: CLINIC | Age: 28
End: 2023-02-09

## 2023-02-09 ENCOUNTER — OFFICE VISIT (OUTPATIENT)
Dept: PSYCHIATRY | Facility: CLINIC | Age: 28
End: 2023-02-09
Payer: COMMERCIAL

## 2023-02-09 DIAGNOSIS — F41.0 PANIC ATTACKS: ICD-10-CM

## 2023-02-09 DIAGNOSIS — F33.2 SEVERE EPISODE OF RECURRENT MAJOR DEPRESSIVE DISORDER, WITHOUT PSYCHOTIC FEATURES: ICD-10-CM

## 2023-02-09 DIAGNOSIS — F41.1 GAD (GENERALIZED ANXIETY DISORDER): Primary | ICD-10-CM

## 2023-02-09 PROCEDURE — 99214 PR OFFICE/OUTPT VISIT, EST, LEVL IV, 30-39 MIN: ICD-10-PCS | Mod: 95,,, | Performed by: STUDENT IN AN ORGANIZED HEALTH CARE EDUCATION/TRAINING PROGRAM

## 2023-02-09 PROCEDURE — 1160F PR REVIEW ALL MEDS BY PRESCRIBER/CLIN PHARMACIST DOCUMENTED: ICD-10-PCS | Mod: CPTII,95,, | Performed by: STUDENT IN AN ORGANIZED HEALTH CARE EDUCATION/TRAINING PROGRAM

## 2023-02-09 PROCEDURE — 99214 OFFICE O/P EST MOD 30 MIN: CPT | Mod: 95,,, | Performed by: STUDENT IN AN ORGANIZED HEALTH CARE EDUCATION/TRAINING PROGRAM

## 2023-02-09 PROCEDURE — 90833 PSYTX W PT W E/M 30 MIN: CPT | Mod: 95,,, | Performed by: STUDENT IN AN ORGANIZED HEALTH CARE EDUCATION/TRAINING PROGRAM

## 2023-02-09 PROCEDURE — 1159F PR MEDICATION LIST DOCUMENTED IN MEDICAL RECORD: ICD-10-PCS | Mod: CPTII,95,, | Performed by: STUDENT IN AN ORGANIZED HEALTH CARE EDUCATION/TRAINING PROGRAM

## 2023-02-09 PROCEDURE — 90833 PR PSYCHOTHERAPY W/PATIENT W/E&M, 30 MIN (ADD ON): ICD-10-PCS | Mod: 95,,, | Performed by: STUDENT IN AN ORGANIZED HEALTH CARE EDUCATION/TRAINING PROGRAM

## 2023-02-09 PROCEDURE — 1160F RVW MEDS BY RX/DR IN RCRD: CPT | Mod: CPTII,95,, | Performed by: STUDENT IN AN ORGANIZED HEALTH CARE EDUCATION/TRAINING PROGRAM

## 2023-02-09 PROCEDURE — 1159F MED LIST DOCD IN RCRD: CPT | Mod: CPTII,95,, | Performed by: STUDENT IN AN ORGANIZED HEALTH CARE EDUCATION/TRAINING PROGRAM

## 2023-02-09 RX ORDER — DULOXETIN HYDROCHLORIDE 60 MG/1
120 CAPSULE, DELAYED RELEASE ORAL DAILY
Qty: 60 CAPSULE | Refills: 2 | Status: SHIPPED | OUTPATIENT
Start: 2023-02-09 | End: 2023-04-12 | Stop reason: SDUPTHER

## 2023-02-09 RX ORDER — TRAZODONE HYDROCHLORIDE 100 MG/1
200 TABLET ORAL NIGHTLY
Qty: 60 TABLET | Refills: 2 | Status: SHIPPED | OUTPATIENT
Start: 2023-02-09 | End: 2023-04-11

## 2023-02-09 RX ORDER — ALPRAZOLAM 1 MG/1
1 TABLET ORAL 3 TIMES DAILY PRN
Qty: 90 TABLET | Refills: 2 | Status: SHIPPED | OUTPATIENT
Start: 2023-02-09 | End: 2023-03-29

## 2023-02-09 RX ORDER — LAMOTRIGINE 200 MG/1
400 TABLET ORAL DAILY
Qty: 60 TABLET | Refills: 2 | Status: SHIPPED | OUTPATIENT
Start: 2023-02-09 | End: 2023-04-12 | Stop reason: SDUPTHER

## 2023-02-09 NOTE — PROGRESS NOTES
"        02/09/2023  12:05 PM  Maury Smith  [id number]    Outpatient Psychiatry Follow-Up Visit (MD/NP)    2/9/2023    Clinical Status of Patient:  Outpatient (Ambulatory)      Chief Complaint:  Maury Smith is a 27 y.o. female who presents today for follow-up of depression and anxiety.  Met with patient.          Interval History and Content of Current Session:  Interim Events/Subjective Report/Content of Current Session:   MDD, recurrent, severe  JAIMEE  Panic attacks  PTSD  OCD (compulsions, buttons on phones)  Bulimia           Reports increased stress at work due to season, working 13 hours per day. She states her anxiety is increased, occurring daily. She reports she is trying coping skill of using ice, and grounding which she is learning in therapy. She states her mood is "very anxious," however does not feel depressed overall. She states she has been staying at her parents, on weekends she and her boyfriend go out to spend time. She states she is having panic attacks twice daily. Could not afford latuda so has not been taking it. She does not feel any sedative effectives from any of her medication.    PTSD symptoms are infrequent.     OCD compulsions are infrequent.     No recent vomiting or binging.         Psychiatric Review Of Systems - Is patient experiencing or having changes in:  sleep: "most nights I feel like I can't sleep"  appetite: "good"  energy/anergy: "pretty high"  interest/pleasure/anhedonia: variable  anxiety/panic: yes  Guilty/hopelessness/worthlessness: yes  concentration: yes  S.I.B.s/risky behavior: no  Irritability: yes, "very"  Substance abuse: no  Racing thoughts: no  Impulsive behaviors: no  Paranoia: no  AVH: no          Psychotherapy:  Target symptoms: depression, anxiety   Why chosen therapy is appropriate versus another modality: relevant to diagnosis  Outcome monitoring methods: self-report  Therapeutic intervention type: supportive psychotherapy  Topics discussed/themes:  " educational, reassurance, building skills sets for symptom management, symptom recognition, self esteem exercise  The patient's response to the intervention is accepting. The patient's progress toward treatment goals is fair.   Duration of intervention: 18 minutes.          Medical Review of Systems   Review of Systems   Constitutional:  Negative for chills and fever.   HENT:  Negative for hearing loss.    Eyes:  Negative for blurred vision and double vision.   Respiratory:  Negative for shortness of breath.    Cardiovascular:  Negative for chest pain and palpitations.   Gastrointestinal:  Negative for constipation, diarrhea, nausea and vomiting.   Genitourinary:  Negative for dysuria.   Musculoskeletal:  Negative for back pain and neck pain.   Skin:  Negative for rash.   Neurological:  Negative for dizziness and headaches.   Endo/Heme/Allergies:  Negative for environmental allergies.       Past Medical, Family and Social History: The patient's past medical, family and social history have been reviewed and updated as appropriate within the electronic medical record - see encounter notes.    Social History     Socioeconomic History    Marital status: Single    Number of children: 0   Tobacco Use    Smoking status: Never    Smokeless tobacco: Never   Substance and Sexual Activity    Alcohol use: Not Currently     Comment: she has cut back since her fall     Drug use: Never    Sexual activity: Yes     Partners: Male         Compliance: yes    Side effects: None    Risk Parameters:  Patient reports suicidal ideation: passive, no intent or plans, chronic  Patient reports no homicidal ideation  Patient reports no self-injurious behavior  Patient reports no violent behavior      Risk factors were reviewed and brief risk assessment completed: estimate low imminent for suicide, long term risk moderate, see below        Exam (detailed: at least 9 elements; comprehensive: all 15 elements)     VITALS could not be obtained 2/2  virtual visit         CONSTITUTIONAL  General Appearance: unremarkable, age appropriate    MUSCULOSKELETAL  Muscle Strength and Tone:no tremor, no tic  Abnormal Involuntary Movements: No  Gait and Station: non-ataxic    PSYCHIATRIC   Level of Consciousness: awake and alert   Orientation: person, place and situation  Grooming: Casually dressed and Well groomed  Psychomotor Behavior: normal, cooperative  Speech: normal tone, normal rate, normal pitch, normal volume  Language: grossly intact  Mood: anxious  Affect: Consistent with mood  Thought Process: linear, logical  Associations: intact   Thought Content: DENIES suicidal ideation and DENIES homicidal ideation  Perceptions: denies hallucinations  Memory: Able to recall past events, Remote intact and Recent intact  Attention:Attends to interview without distraction  Fund of Knowledge: Aware of current events and Vocabulary appropriate   Estimate if Intelligence:  Average based on work/education history, vocabulary and mental status exam  Insight: has awareness of illness  Judgment: behavior is adequate to circumstances        Assessment and Diagnosis   Status/Progress: Based on the examination today, the patient's problem(s) is/are treatment resistant.  New problems have not been presented today.   Co-morbidities are complicating management of the primary condition.          Assessment/Impression:     MDD, recurrent, severe  JAIMEE  Panic attacks  PTSD  OCD  Bulimia            Plan:          MDD, recurrent, severe  - continue lamictal 400 mg PO qd  - increase cymbalta 90 to 120 mg PO qd  - continue psychotherapy with Solange Rollins weekly  - declines FVA, ED/911 precautions given; if no improvement in 1 week or worsening sx patient agrees to seek admission   - pt counseled        JAIMEE  - increase trazodone 150 to 200  mg PO qhs as sleep adjunct  - continue melatonin 5-20 mg  PO qhs as sleep adjunct  - continue lyrica 75 mg PO TID  - continue psychotherapy with Solange  Ayo weekly  - pt counseled       OCD  - increase cymbalta 90 to 120 mg PO qd  - continue psychotherapy with Solange Rollins weekly  - pt counseled    Panic attacks  - change klonopin 1 mg PO TID to xanax 1 mg PO TID  - continue psychotherapy with Solange Rollins weekly  - pt counseled        PTSD  - increase cymbalta 90 to 120 mg PO qd  - continue psychotherapy with Solange Ayo weekly  - pt counseled       Bulimia Nervosa  - increase cymbalta 90 to 120 mg PO qd  - continue psychotherapy with Solange Rollins weekly  - pt counseled               - Instructed patient to keep all scheduled appointments, take medications as prescribed and abstain from substance abuse. Instructed to call 911 or present to ER for emergency including SI or HI.    - Discussed diagnosis, risks and benefits of proposed treatment above vs alternative treatments vs no treatment, and potential side effects of these treatments. Discussed the inherent unpredictability of treatment. The patient expresses understanding of the above and displays the capacity to agree with this treatment given said understanding. Patient also agrees that, currently, the benefits outweigh the risks and would like to pursue this treatment at this time.     - Any medications being used off-label were discussed with the patient inclusive of the evidence base for the use of the medications and consent was obtained for the off-label use of the medication.         Axel Villafana III, MD    Return to Clinic: 1 month

## 2023-03-17 ENCOUNTER — NURSE TRIAGE (OUTPATIENT)
Dept: ADMINISTRATIVE | Facility: CLINIC | Age: 28
End: 2023-03-17
Payer: COMMERCIAL

## 2023-03-17 NOTE — TELEPHONE ENCOUNTER
"OOC NT incoming call PES escalation -  Pt reports ideas of hurting herself. Hearing voices to hurt herself Thoughts of Suicide 1 1/2 years ago with treatment in ED and currently followed by Dr. ROMAIN Villafana [facility] psychiatry. Had panic attack today did take ALPRAZolam (XANAX) 1 MG tablet  TID but also took extra 1 mg because she felt as if med was not working fast enough. Suicide Concerns protocol followed and SI procedure started. [facility]  NT team placed call to 911. See separate note. Pt is very calm but has episodes of crying feels she is not "strong" and wants to be stronger emotionally. NT continued on the phone with pt until officer Angella Paredes arrived with EMS service.   Encounter routed to Dr. WYATT Villafana and communication sent to PCP as I was unable to route to PCP.  Reason for Disposition   Patient is threatening suicide now    Additional Information   Negative: Patient attempted suicide    Protocols used: Suicide [id number]    "

## 2023-03-17 NOTE — TELEPHONE ENCOUNTER
Spoke with Steve Ling . Notified that pt is currently having suicidal thoughts and is on the line with another triage nurse. Address provided. PD on the way out. Encounter routed to provider.        [facility] with [facility]. Ambulance on the way and waiting for clearance from PD.  Spoke with  1030. PD en route to pt.   Reason for Disposition   Caller has already spoken with another triager and has no further questions.    Protocols used: No Contact or Duplicate Contact Call-A-

## 2023-03-18 ENCOUNTER — HOSPITAL ENCOUNTER (INPATIENT)
Facility: HOSPITAL | Age: 28
LOS: 3 days | Discharge: HOME | DRG: 885 | End: 2023-03-21
Attending: PSYCHIATRY & NEUROLOGY | Admitting: PSYCHIATRY & NEUROLOGY
Payer: COMMERCIAL

## 2023-03-18 DIAGNOSIS — R45.851 DEPRESSION WITH SUICIDAL IDEATION: Primary | ICD-10-CM

## 2023-03-18 DIAGNOSIS — F32.A DEPRESSION WITH SUICIDAL IDEATION: Primary | ICD-10-CM

## 2023-03-18 LAB
CHOLEST SERPL-MCNC: 235 MG/DL (ref 120–199)
CHOLEST/HDLC SERPL: 5 {RATIO} (ref 2–5)
ESTIMATED AVG GLUCOSE: 103 MG/DL (ref 68–131)
HBA1C MFR BLD: 5.2 % (ref 4–5.6)
HDLC SERPL-MCNC: 47 MG/DL (ref 40–75)
HDLC SERPL: 20 % (ref 20–50)
LDLC SERPL CALC-MCNC: 166 MG/DL (ref 63–159)
NONHDLC SERPL-MCNC: 188 MG/DL
TRIGL SERPL-MCNC: 110 MG/DL (ref 30–150)

## 2023-03-18 PROCEDURE — 99232 PR SUBSEQUENT HOSPITAL CARE,LEVL II: ICD-10-PCS | Mod: ,,, | Performed by: FAMILY MEDICINE

## 2023-03-18 PROCEDURE — 11400000 HC PSYCH PRIVATE ROOM

## 2023-03-18 PROCEDURE — 99233 SBSQ HOSP IP/OBS HIGH 50: CPT | Mod: ,,, | Performed by: PSYCHIATRY & NEUROLOGY

## 2023-03-18 PROCEDURE — 80061 LIPID PANEL: CPT | Performed by: PSYCHIATRY & NEUROLOGY

## 2023-03-18 PROCEDURE — 99233 PR SUBSEQUENT HOSPITAL CARE,LEVL III: ICD-10-PCS | Mod: ,,, | Performed by: PSYCHIATRY & NEUROLOGY

## 2023-03-18 PROCEDURE — 83036 HEMOGLOBIN GLYCOSYLATED A1C: CPT | Performed by: PSYCHIATRY & NEUROLOGY

## 2023-03-18 PROCEDURE — 25000003 PHARM REV CODE 250: Performed by: PSYCHIATRY & NEUROLOGY

## 2023-03-18 PROCEDURE — 99232 SBSQ HOSP IP/OBS MODERATE 35: CPT | Mod: ,,, | Performed by: FAMILY MEDICINE

## 2023-03-18 RX ORDER — ALPRAZOLAM 0.5 MG/1
1 TABLET ORAL 3 TIMES DAILY PRN
Status: DISCONTINUED | OUTPATIENT
Start: 2023-03-18 | End: 2023-03-19

## 2023-03-18 RX ORDER — OLANZAPINE 10 MG/1
10 TABLET ORAL EVERY 4 HOURS PRN
Status: DISCONTINUED | OUTPATIENT
Start: 2023-03-18 | End: 2023-03-21 | Stop reason: HOSPADM

## 2023-03-18 RX ORDER — PREGABALIN 75 MG/1
75 CAPSULE ORAL 3 TIMES DAILY
Status: DISCONTINUED | OUTPATIENT
Start: 2023-03-18 | End: 2023-03-20

## 2023-03-18 RX ORDER — OLANZAPINE 10 MG/2ML
10 INJECTION, POWDER, FOR SOLUTION INTRAMUSCULAR EVERY 6 HOURS PRN
Status: DISCONTINUED | OUTPATIENT
Start: 2023-03-18 | End: 2023-03-21 | Stop reason: HOSPADM

## 2023-03-18 RX ORDER — IBUPROFEN 200 MG
1 TABLET ORAL DAILY PRN
Status: DISCONTINUED | OUTPATIENT
Start: 2023-03-18 | End: 2023-03-21 | Stop reason: HOSPADM

## 2023-03-18 RX ORDER — MONTELUKAST SODIUM 10 MG/1
10 TABLET ORAL NIGHTLY
Status: DISCONTINUED | OUTPATIENT
Start: 2023-03-18 | End: 2023-03-21 | Stop reason: HOSPADM

## 2023-03-18 RX ORDER — HYDROXYZINE PAMOATE 50 MG/1
50 CAPSULE ORAL ONCE
Status: COMPLETED | OUTPATIENT
Start: 2023-03-18 | End: 2023-03-18

## 2023-03-18 RX ORDER — TRAZODONE HYDROCHLORIDE 100 MG/1
200 TABLET ORAL NIGHTLY
Status: DISCONTINUED | OUTPATIENT
Start: 2023-03-18 | End: 2023-03-21 | Stop reason: HOSPADM

## 2023-03-18 RX ORDER — LAMOTRIGINE 100 MG/1
400 TABLET ORAL DAILY
Status: DISCONTINUED | OUTPATIENT
Start: 2023-03-18 | End: 2023-03-21 | Stop reason: HOSPADM

## 2023-03-18 RX ORDER — BUPROPION HYDROCHLORIDE 150 MG/1
150 TABLET ORAL DAILY
Status: DISCONTINUED | OUTPATIENT
Start: 2023-03-18 | End: 2023-03-20

## 2023-03-18 RX ORDER — HYDROXYZINE PAMOATE 50 MG/1
50 CAPSULE ORAL EVERY 6 HOURS PRN
Status: DISCONTINUED | OUTPATIENT
Start: 2023-03-18 | End: 2023-03-21 | Stop reason: HOSPADM

## 2023-03-18 RX ORDER — DULOXETIN HYDROCHLORIDE 30 MG/1
120 CAPSULE, DELAYED RELEASE ORAL DAILY
Status: DISCONTINUED | OUTPATIENT
Start: 2023-03-18 | End: 2023-03-21 | Stop reason: HOSPADM

## 2023-03-18 RX ORDER — MUPIROCIN 20 MG/G
OINTMENT TOPICAL 2 TIMES DAILY
Status: DISCONTINUED | OUTPATIENT
Start: 2023-03-18 | End: 2023-03-21 | Stop reason: HOSPADM

## 2023-03-18 RX ADMIN — BUPROPION HYDROCHLORIDE 150 MG: 150 TABLET, EXTENDED RELEASE ORAL at 01:03

## 2023-03-18 RX ADMIN — PREGABALIN 75 MG: 75 CAPSULE ORAL at 08:03

## 2023-03-18 RX ADMIN — HYDROXYZINE PAMOATE 50 MG: 50 CAPSULE ORAL at 01:03

## 2023-03-18 RX ADMIN — PREGABALIN 75 MG: 75 CAPSULE ORAL at 03:03

## 2023-03-18 RX ADMIN — LAMOTRIGINE 400 MG: 100 TABLET ORAL at 01:03

## 2023-03-18 RX ADMIN — DULOXETINE 120 MG: 30 CAPSULE, DELAYED RELEASE ORAL at 01:03

## 2023-03-18 RX ADMIN — MONTELUKAST 10 MG: 10 TABLET, FILM COATED ORAL at 08:03

## 2023-03-18 RX ADMIN — TRAZODONE HYDROCHLORIDE 200 MG: 100 TABLET ORAL at 08:03

## 2023-03-18 NOTE — CONSULTS
"[facility] Hospital Medicine  Consult Note    Patient Name: Maury Smith  MRN: [id number]  Admission Date: 3/18/2023  Hospital Length of Stay: 0 days  Attending Physician: Fabiana Hutchins MD   Primary Care Provider: Nicolas Kenny MD           Patient information was obtained from patient and ER records.   History & Physical      SUBJECTIVE:     History of Present Illness:  Patient is a 27 y.o. female presents with depression and suicidal thoughts. Admitted to [facility].    No past medical history other than psych. No complaints today.    PTA Medications   Medication Sig    montelukast (SINGULAIR) 10 mg tablet TAKE 1 TABLET(10 MG) BY MOUTH EVERY EVENING    pregabalin (LYRICA) 75 MG capsule Take 1 capsule (75 mg total) by mouth 3 (three) times daily.    traZODone (DESYREL) 100 MG tablet Take 2 tablets (200 mg total) by mouth every evening.    ALPRAZolam (XANAX) 1 MG tablet Take 1 tablet (1 mg total) by mouth 3 (three) times daily as needed for Anxiety.    drospirenone-ethinyl estradioL (ANGELO) 3-0.02 mg per tablet Take 1 tablet by mouth once daily.    DULoxetine (CYMBALTA) 60 MG capsule Take 2 capsules (120 mg total) by mouth once daily.    lamoTRIgine (LAMICTAL) 200 MG tablet Take 2 tablets (400 mg total) by mouth once daily.    liraglutide, weight loss, (SAXENDA) 3 mg/0.5 mL (18 mg/3 mL) PnIj Please inject 0.6mg subcutaneously daily x one week  Week two increase to 1.2mg SC daily x one week   Week three increase to 1.8mg Sc daily x one week   Week four increase to 2.4 mg SC daily x one week  Week Five increase to 3.0 mg (Patient taking differently: 3 mg by abdominal subcutaneous route once daily.)    pen needle, diabetic (EASY COMFORT PEN NEEDLES) 31 gauge x 5/16" Ndle 1 Device by Misc.(Non-Drug; Combo Route) route once daily.       Review of patient's allergies indicates:   Allergen Reactions    Betadine [povidone-iodine]     Adhesive Rash    Latex, natural rubber Rash    Scrub " chlorhexidine gluconate [chlorhexidine gluconate] Rash     chrolaprep causes a reaction to the patient skin        Past Medical History:   Diagnosis Date    Anxiety     Depression     Endometriosis     Hx of psychiatric care     IC (interstitial cystitis)     Obsessive-compulsive disorder     Psychiatric problem     PTSD (post-traumatic stress disorder)     Therapy      Past Surgical History:   Procedure Laterality Date    CYSTOSCOPY WITH HYDRODISTENSION OF BLADDER N/A 11/12/2019    Procedure: CYSTOSCOPY, WITH BLADDER HYDRODISTENSION;  Surgeon: Nathaniel Degroot MD;  Location: [facility] OR;  Service: Urology;  Laterality: N/A;    DIAGNOSTIC LAPAROSCOPY N/A 11/12/2019    Procedure: LAPAROSCOPY, DIAGNOSTIC;  Surgeon: Jamey Price MD;  Location: [facility] OR;  Service: OB/GYN;  Laterality: N/A;    DILATION AND CURETTAGE OF UTERUS      laproscopic surgery      x3    WISDOM TOOTH EXTRACTION       Family History   Problem Relation Age of Onset    Hyperlipidemia Father     Endometriosis Sister     Anemia Sister     No Known Problems Brother      Social History     Tobacco Use    Smoking status: Never    Smokeless tobacco: Never   Substance Use Topics    Alcohol use: Not Currently     Comment: she has cut back since her fall     Drug use: Never        Review of Systems:  Constitutional: no fever or chills  Respiratory: no cough or shorness of breath  Cardiovascular: no chest pain or palpitations  Gastrointestinal: no nausea or vomiting, no abdominal pain or change in bowel habits  Musculoskeletal: no arthralgias or myalgias  Psych: depressed/anxious  OBJECTIVE:     Vital Signs (Most Recent)  Temp: 98 °F (36.7 °C) (03/18/23 0801)  Pulse: 87 (03/18/23 0801)  Resp: 16 (03/18/23 0801)  BP: (!) 105/55 (03/18/23 0801)  SpO2: (!) 94 % (03/18/23 0036)    Physical Exam:  General: well developed, well nourished  Lungs:  clear to auscultation bilaterally and normal respiratory effort  Cardiovascular: Heart:  regular rate and rhythm, S1, S2 normal, no murmur, click, rub or gallop. Chest Wall: no tenderness. Extremities: no cyanosis or edema, or clubbing. Pulses: 2+ and symmetric.  Abdomen/Rectal: Abdomen: soft, non-tender non-distented; bowel sounds normal; no masses,  no organomegaly. Rectal: not examined  Skin: Skin color, texture, turgor normal. No rashes or lesions  Musculoskeletal:normal gait  Psych: depressed/congruent affect  Neuro: Cranial nerves:  CN II Visual fields full to confrontation.   CN III, IV, VI Pupils are equal, round, and reactive to light.  CN III: no palsy  Nystagmus: none   Diplopia: none  Ophthalmoparesis: none  CN V Facial sensation intact.   CN VII Facial expression full, symmetric.   CN VIII normal.   CN IX normal.   CN X normal.   CN XI normal.   CN XII normal.        Laboratory  CBC:   Recent Labs   Lab 03/17/23 2059   WBC 6.60   RBC 4.41   HGB 13.3   HCT 39.3      MCV 89   MCH 30.2   MCHC 33.8     CMP:   Recent Labs   Lab 03/17/23 2059   GLU 98   CALCIUM 9.8   ALBUMIN 4.9   PROT 8.5*      K 4.3   CO2 26      BUN 17   CREATININE 0.65*   ALKPHOS 91   ALT 36   AST 31   BILITOT 0.4     Recent Labs   Lab 03/17/23 1955   COLORU Colorless*   SPECGRAV 1.010   PHUR 7.0   PROTEINUA Negative   NITRITE Negative   LEUKOCYTESUR Negative   UROBILINOGEN Negative     TSH   Date Value Ref Range Status   07/27/2020 1.72 0.46 - 4.68 mIU/L Final     No results found for this or any previous visit.  Alcohol, Serum   Date Value Ref Range Status   03/17/2023 <10 <10 mg/dL Final     Acetaminophen (Tylenol), Serum   Date Value Ref Range Status   03/17/2023 <10.0 10.0 - 20.0 ug/mL Final     Comment:     Toxic Levels:  Adults (4 hr post-ingestion).........>150 ug/mL  Adults (12 hr post-ingestion)........>40 ug/mL  Peds (2 hr post-ingestion, liquid)...>225 ug/mL       Results for orders placed or performed during the hospital encounter of 03/17/23   Salicylate level   Result Value Ref Range     Salicylate Lvl <1 mg/dL     Results for orders placed or performed during the hospital encounter of 03/17/23   Drug screen panel, emergency   Result Value Ref Range    Benzodiazepines Positive (See Note) (A) Negative    Cocaine (Metab.) Negative Negative    Opiate Scrn, Ur Positive (See Note) (A) Negative    Barbiturate Screen, Ur Negative Negative    Amphetamine Screen, Ur Negative Negative    THC Negative Negative    Phencyclidine Negative Negative    Tricyclic Antidepressants (TCA), Urine Negative Negative    Toxicology Information SEE COMMENT      Preg Test, Ur   Date Value Ref Range Status   03/17/2023 Negative  Final       ASSESSMENT/PLAN:     Active Hospital Problems    Diagnosis  POA    *Severe episode of recurrent major depressive disorder [F33.2]  Yes    Panic attacks [F41.0]  Yes    JAIMEE (generalized anxiety disorder) [F41.1]  Yes      Resolved Hospital Problems   No resolved problems to display.       Plan: Further orders for psych    Assessment/Plan:     * Severe episode of recurrent major depressive disorder  With hallucinations.  Orders per psyche.      VTE Risk Mitigation (From admission, onward)    None              Thank you for your consult. I will sign off. Please contact us if you have any additional questions.    Loli Decker MD  Department of Hospital Medicine   [facility]

## 2023-03-18 NOTE — H&P
"PSYCHIATRY INPATIENT ADMISSION NOTE - H & P    3/18/2023 4:04 PM   Maury Smith   1995   [id number]       DATE OF ADMISSION: 3/18/2023 12:20 AM  SITE: [facility]  CURRENT LEGAL STATUS: PEC and/or CEC    The patient was seen and examined. The chart was reviewed. The patient was medically cleared by ER provider and then admitted to the [facility].    History of Present Illness:     Maury Smith is a 27 y.o. female with a past psychiatric history of MDD, JAIMEE, Panic Attacks, PTSD, OCD, and Bulimia currently admitted to the inpatient unit for worsening depression in setting of increased work stress. 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 She has difficulty taking breaks at work, asking for help, and feels guilty that she would need to receive help from her father. Her father would be happy to help and wants her to take breaks, but she has difficulty letting go of control and has high levels of guilt regard this situation and including wanting to be able to help her boyfriend deal with his graduate school stress.     She notes that she has loud internal dialogue recently which express "the world would be better off if you were alive. She has trouble staying asleep, she wont take time to eat,but appetite is stable. She states internal dialogue causes her problems with concentration. She cognitively understands that things are not hopeless, but does feel hopeless and overwhelmed.     Review of Systems  Ophthalmic ROS: negative  ENT ROS: negative  Allergy and Immunology ROS: negative  Hematological and Lymphatic ROS: negative  Endocrine ROS: negative  Respiratory ROS: no cough, shortness of breath, or wheezing  Cardiovascular ROS: no chest pain or dyspnea on exertion  Gastrointestinal ROS: no abdominal pain, change in bowel habits, or black or bloody stools  Genito-Urinary ROS: no dysuria, trouble " voiding, or hematuria  Musculoskeletal ROS: negative  Neurological ROS: no TIA or stroke symptoms  Dermatological ROS: negative    Review of Psychiatric Symptoms  Denies symptoms of chace, +/-psychosis, eating d/o, impulse control disorder, sexual disorder  Endorsed symptoms-see above in HPI section     SOCIAL HISTORY  From [place], lives between bf and parents, is in college for accounting, no children  Access to Gun: denies      PAST PSYCHIATRIC HISTORY  No SA or inpt    SUBSTANCE ABUSE HISTORY   No alcohol, drugs or tobacco use       ALLERGIES   Review of patient's allergies indicates:   Allergen Reactions    Betadine [povidone-iodine]     Adhesive Rash    Latex, natural rubber Rash    Scrub chlorhexidine gluconate [chlorhexidine gluconate] Rash     chrolaprep causes a reaction to the patient skin        PAST MEDICAL & SURGICAL HISTORY     Past Medical History:   Diagnosis Date    Anxiety     Depression     Endometriosis     Hx of psychiatric care     IC (interstitial cystitis)     Obsessive-compulsive disorder     Psychiatric problem     PTSD (post-traumatic stress disorder)     Therapy      Past Surgical History:   Procedure Laterality Date    CYSTOSCOPY WITH HYDRODISTENSION OF BLADDER N/A 11/12/2019    Procedure: CYSTOSCOPY, WITH BLADDER HYDRODISTENSION;  Surgeon: Nathaniel Degroot MD;  Location: [facility] OR;  Service: Urology;  Laterality: N/A;    DIAGNOSTIC LAPAROSCOPY N/A 11/12/2019    Procedure: LAPAROSCOPY, DIAGNOSTIC;  Surgeon: Jamey Price MD;  Location: [facility] OR;  Service: OB/GYN;  Laterality: N/A;    DILATION AND CURETTAGE OF UTERUS      laproscopic surgery      x3    WISDOM TOOTH EXTRACTION         CURRENT MEDICATION REGIMEN PER Epic:  Home Meds:   Prior to Admission medications    Medication Sig Start Date End Date Taking? Authorizing Provider   montelukast (SINGULAIR) 10 mg tablet TAKE 1 TABLET(10 MG) BY MOUTH EVERY EVENING 12/8/22  Yes Nicolas Kenny MD   pregabalin (LYRICA) 75 MG  "capsule Take 1 capsule (75 mg total) by mouth 3 (three) times daily. 1/10/23 7/11/23 Yes Axel Villafana III, MD   traZODone (DESYREL) 100 MG tablet Take 2 tablets (200 mg total) by mouth every evening. 2/9/23 2/9/24 Yes Axel Villafana III, MD   ALPRAZolam (XANAX) 1 MG tablet Take 1 tablet (1 mg total) by mouth 3 (three) times daily as needed for Anxiety. 2/9/23 3/17/23  Axel Villafana III, MD   drospirenone-ethinyl estradioL (ANGELO) 3-0.02 mg per tablet Take 1 tablet by mouth once daily.    Historical Provider   DULoxetine (CYMBALTA) 60 MG capsule Take 2 capsules (120 mg total) by mouth once daily. 2/9/23 2/9/24  Axel Villafana III, MD   lamoTRIgine (LAMICTAL) 200 MG tablet Take 2 tablets (400 mg total) by mouth once daily. 2/9/23 2/9/24  Axel Villafana III, MD   liraglutide, weight loss, (SAXENDA) 3 mg/0.5 mL (18 mg/3 mL) PnIj Please inject 0.6mg subcutaneously daily x one week  Week two increase to 1.2mg SC daily x one week   Week three increase to 1.8mg Sc daily x one week   Week four increase to 2.4 mg SC daily x one week  Week Five increase to 3.0 mg  Patient taking differently: 3 mg by abdominal subcutaneous route once daily. 6/23/22   Nicolas Kenny MD   pen needle, diabetic (EASY COMFORT PEN NEEDLES) 31 gauge x 5/16" Ndle 1 Device by Misc.(Non-Drug; Combo Route) route once daily. 6/23/22   Nicolas Kenny MD   buPROPion (WELLBUTRIN XL) 150 MG TB24 tablet Take 1 tablet (150 mg total) by mouth once daily. 3/21/22 5/23/22  Nicolas Kenny MD       Scheduled Meds:    buPROPion  150 mg Oral Daily    DULoxetine  120 mg Oral Daily    lamoTRIgine  400 mg Oral Daily    montelukast  10 mg Oral QHS    mupirocin   Nasal BID    pregabalin  75 mg Oral TID    traZODone  200 mg Oral QHS      PRN Meds: ALPRAZolam, hydrOXYzine pamoate, nicotine, OLANZapine **AND** OLANZapine   Psychotherapeutics (From admission, onward)      Start     Stop Route Frequency Ordered    03/18/23 2100  traZODone tablet 200 mg         -- Oral " Nightly 03/18/23 1204    03/18/23 1315  DULoxetine DR capsule 120 mg         -- Oral Daily 03/18/23 1204    03/18/23 1315  buPROPion TB24 tablet 150 mg         -- Oral Daily 03/18/23 1210    03/18/23 1307  ALPRAZolam tablet 1 mg         -- Oral 3 times daily PRN 03/18/23 1208    03/18/23 0032  OLANZapine tablet 10 mg  (Olanzapine PRN (> 64 yo))        See Hyperspace for full Linked Orders Report.    -- Oral Every 4 hours PRN 03/18/23 0032    03/18/23 0032  OLANZapine injection 10 mg  (Olanzapine PRN (> 64 yo))        See Hyperspace for full Linked Orders Report.    -- IM Every 6 hours PRN 03/18/23 0032            LABORATORY DATA   Recent Results (from the past 72 hour(s))   Drug screen panel, emergency    Collection Time: 03/17/23  7:55 PM   Result Value Ref Range    Benzodiazepines Positive (See Note) (A) Negative    Cocaine (Metab.) Negative Negative    Opiate Scrn, Ur Positive (See Note) (A) Negative    Barbiturate Screen, Ur Negative Negative    Amphetamine Screen, Ur Negative Negative    THC Negative Negative    Phencyclidine Negative Negative    Tricyclic Antidepressants (TCA), Urine Negative Negative    Toxicology Information SEE COMMENT    Urinalysis, Reflex to Urine Culture Urine, Clean Catch    Collection Time: 03/17/23  7:55 PM    Specimen: Urine, Clean Catch   Result Value Ref Range    Specimen UA Urine, Clean Catch     Color, UA Colorless (A) Yellow, Straw, Cortney    Appearance, UA Clear Clear    pH, UA 7.0 5.0 - 9.0    Specific Gravity, UA 1.010     Protein, UA Negative Negative    Glucose, UA Negative Negative    Ketones, UA Negative Negative    Bilirubin (UA) Negative Negative    Occult Blood UA Negative Negative    Nitrite, UA Negative Negative    Urobilinogen, UA Negative Negative EU/dL    Leukocytes, UA Negative Negative   Pregnancy, urine rapid    Collection Time: 03/17/23  7:55 PM   Result Value Ref Range    Preg Test, Ur Negative    Ethanol    Collection Time: 03/17/23  8:59 PM   Result Value  Ref Range    Alcohol, Serum <10 <10 mg/dL   CBC auto differential    Collection Time: 03/17/23  8:59 PM   Result Value Ref Range    WBC 6.60 3.90 - 12.70 K/uL    RBC 4.41 4.00 - 5.40 M/uL    Hemoglobin 13.3 12.0 - 16.0 g/dL    Hematocrit 39.3 37.0 - 48.5 %    MCV 89 82 - 98 fL    MCH 30.2 27.0 - 31.0 pg    MCHC 33.8 32.0 - 36.0 g/dL    RDW 12.6 11.5 - 14.5 %    Platelets 245 150 - 450 K/uL    MPV 7.2 (L) 7.4 - 10.4 fL    Gran # (ANC) 3.7 1.8 - 7.7 K/uL    Lymph # 2.4 1.0 - 4.8 K/uL    Mono # 0.5 0.3 - 1.0 K/uL    Eos # 0.1 0.0 - 0.5 K/uL    Baso # 0.00 0.00 - 0.20 K/uL    nRBC 0 0 /100 WBC    Gran % 55.4 38.0 - 73.0 %    Lymph % 35.6 18.0 - 48.0 %    Mono % 6.9 4.0 - 15.0 %    Eosinophil % 1.7 0.0 - 8.0 %    Basophil % 0.4 0.0 - 1.9 %    Differential Method Automated    Comprehensive metabolic panel    Collection Time: 03/17/23  8:59 PM   Result Value Ref Range    Sodium 143 136 - 145 mmol/L    Potassium 4.3 3.5 - 5.1 mmol/L    Chloride 104 95 - 110 mmol/L    CO2 26 23 - 29 mmol/L    Glucose 98 74 - 106 mg/dL    BUN 17 7 - 17 mg/dL    Creatinine 0.65 (L) 0.70 - 1.20 mg/dL    Calcium 9.8 8.4 - 10.2 mg/dL    Total Protein 8.5 (H) 6.3 - 8.2 g/dL    Albumin 4.9 3.5 - 5.2 g/dL    Total Bilirubin 0.4 0.2 - 1.3 mg/dL    Alkaline Phosphatase 91 38 - 145 U/L    AST 31 14 - 36 U/L    ALT 36 10 - 44 U/L    Anion Gap 13 8 - 16 mmol/L    eGFR >60 >60 mL/min/1.73 m^2   Salicylate level    Collection Time: 03/17/23  8:59 PM   Result Value Ref Range    Salicylate Lvl <1 mg/dL   Acetaminophen level    Collection Time: 03/17/23  8:59 PM   Result Value Ref Range    Acetaminophen (Tylenol), Serum <10.0 10.0 - 20.0 ug/mL   COVID-19 Routine Screening    Collection Time: 03/17/23  9:07 PM   Result Value Ref Range    SARS-CoV2 (COVID-19) Qualitative PCR Negative Negative   Hemoglobin A1c    Collection Time: 03/18/23  6:52 AM   Result Value Ref Range    Hemoglobin A1C 5.2 4.0 - 5.6 %    Estimated Avg Glucose 103 68 - 131 mg/dL   Lipid panel  "   Collection Time: 03/18/23  6:52 AM   Result Value Ref Range    Cholesterol 235 (H) 120 - 199 mg/dL    Triglycerides 110 30 - 150 mg/dL    HDL 47 40 - 75 mg/dL    LDL Cholesterol 166.0 (H) 63.0 - 159.0 mg/dL    HDL/Cholesterol Ratio 20.0 20.0 - 50.0 %    Total Cholesterol/HDL Ratio 5.0 2.0 - 5.0    Non-HDL Cholesterol 188 mg/dL      No results found for: PHENYTOIN, PHENOBARB, VALPROATE, CBMZ      Is the patient aware of the biomedical complications associated with substance abuse and mental illness? yes  Does the patient have an Advance Directive for Mental Health treatment? no  (If yes, inform patient to bring copy.)    Physical Exam  Reviewed note/exam by Dr. Brianne BINGHAM   Vitals:    03/18/23 0801   BP: (!) 105/55   Pulse: 87   Resp: 16   Temp: 98 °F (36.7 °C)        Gait and Station: Stable  Involuntary Movements: none  Muscle Tone: normal  Strength: 5/5 in 4 extremities    MSE:   Alert and oriented x3, fund of knowledge intact x 4 presidents, 3/3 at 5 minute memory, DLROW-attention intact, language fluent english   Appearance: stated age, well groomed, timid  Behavior No psychomotor agitation or retardation, good eye contact  Speech normal rate, tone, volume, conversational.    Language fluent English.   Mood "depressed"  Affect constricted, euthymic, appropriate, mood congruent  Thought process: linear and logical; Associations intact.    Thought content: denies suicidal or homicidal ideation.  Denies auditory hallucinations, paranoid ideation, ideas of reference. +negative thoughts  Insight good  Judgment good        ASSESSMENT/PLAN:   (Diagnosis and Medical Decision Making)    MDD, recurrent, severe  - continue lamictal 400 mg PO qd  - cymbalta 90 to 120 mg PO qd was increased 1.5 months ago  - Add Wellbutrin XL 150mg PO daily   - continue psychotherapy with Solange sweeney     JAIMEE  - increased trazodone 150 to 200  mg PO qhs as sleep adjunct 1.5 months ago  - continue melatonin 5-20 mg  PO qhs " as sleep adjunct  - continue lyrica 75 mg PO TID  - continue psychotherapy with Solange Ayo weekly        OCD  - increase cymbalta 90 to 120 mg PO qd  - continue psychotherapy with Solange Ayo weekly  - pt counseled    Panic attacks  -  Xanax 1 mg PO TID  - continue psychotherapy with Solange Ayo weekly  - pt counseled        PTSD  - increase cymbalta 90 to 120 mg PO qd 1.5 months ago  - continue psychotherapy with Solange Ayo weekly  - pt counseled        Bulimia Nervosa  - increase cymbalta 90 to 120 mg PO qd 1.5 months ago  - continue psychotherapy with Solange Ayo weekly  - pt counseled       Psychosocial stressor discussed and counseling provided. Have review prior clinically relevant documents     DIAGNOSTIC TESTING  Labs reviewed  Will follow up pending labs    ELOS: 4-5 day(s)    Disposition:  -SW to assist with aftercare planning and collateral  -Once stable discharge home with outpatient follow up care and/or rehab  -Continue inpatient treatment under a PEC and/or CEC for danger to self, and danger to others, and/or grave disability.    Fabiana Hutchins MD  Psychiatry

## 2023-03-18 NOTE — NURSING
Report received.  Pt in on PEC.  [facility], called help line with SI who recommended pt come to ER.  Has a mental health counselor whom she sees every Tuesday.  C/o panic attack, xanax 1mg po given at 2317.  UDS positive for benzo and opiates.  [facility] there for transport.

## 2023-03-18 NOTE — NURSING
Educated patient on CBT handout packet. Discussed healthy coping skills and encouraged this patient to complete the packet.  Encouraged patient to live a drug free life style.  Discussed healthy coping skills and techniques. Discussed triggers to substance abuse and motivations  to stop use.  Patient voiced understanding.

## 2023-03-18 NOTE — NURSING
Patient arrived to [facility] from [facility], via [place], escorted by [facility] employees and hospital security. PEC with patient. Scanned, no contraband found. Ambulated to assessment room without difficulty.

## 2023-03-18 NOTE — NURSING
Pt affect blunted, guarded, pleasant and polite. Reports anxiety, denies depression, suicidal ideations or hallucinations.  Administered AM prescribed meds as ordered. Pt took meds willingly as prescribed. Encouraged pt to attend groups.   Will continue to monitor 1hr rounding with MHT. No distress noted.

## 2023-03-18 NOTE — NURSING
"Admit assessment done.  Pt very pleasant and polite.  Neat and clean.  Skin assessment complete by female nurse.  WNL.  Pt rights and expectations reviewed and handouts provided.  Pt signed all paperwork.  States reason for admit as "I started hearing voices telling me to harm myself and I can't block them out."  Hallucinations started a year ago.  Denies SI/HI.  No V/H.  Has been having non specific SI, thinking it would not be bad if she .  No plans to attempt suicide.  Has been having increased stress at work.   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  Rates depression 9/10 and anxiety 10/10.  Has panic attacks daily.  Symptoms are chest feels heavy, crying uncontrollably, hyperventilating, and shaking.   Coping skill is calling sister and crying it out.  Appetite and sleep poor.  Sleep less than 6 hours a night, average 4 hours.  Lives with bf and plans dc same.  Thinks she might have ADHD and would like to be tested.  Food and fluids provided.  Oriented to unit.  Vistaril prn given for sleep and anxiety.  No distress noted.  Will monitor for safety.  "

## 2023-03-18 NOTE — PLAN OF CARE
Problem: Adult Behavioral Health Plan of Care  Goal: Plan of Care Review  Outcome: Ongoing, Progressing  Flowsheets (Taken 3/18/2023 1315)  Plan of Care Reviewed With: patient  Patient Agreement with Plan of Care: agrees  Goal: Patient-Specific Goal (Individualization)  Outcome: Ongoing, Progressing  Flowsheets (Taken 3/18/2023 1315)  Patient Personal Strengths:   expressive of emotions   stable living environment

## 2023-03-19 PROCEDURE — 99232 PR SUBSEQUENT HOSPITAL CARE,LEVL II: ICD-10-PCS | Mod: ,,, | Performed by: PSYCHIATRY & NEUROLOGY

## 2023-03-19 PROCEDURE — 25000003 PHARM REV CODE 250: Performed by: PSYCHIATRY & NEUROLOGY

## 2023-03-19 PROCEDURE — 99232 SBSQ HOSP IP/OBS MODERATE 35: CPT | Mod: ,,, | Performed by: PSYCHIATRY & NEUROLOGY

## 2023-03-19 PROCEDURE — 11400000 HC PSYCH PRIVATE ROOM

## 2023-03-19 RX ORDER — ACETAMINOPHEN 500 MG
1000 TABLET ORAL EVERY 8 HOURS PRN
Status: DISCONTINUED | OUTPATIENT
Start: 2023-03-19 | End: 2023-03-21 | Stop reason: HOSPADM

## 2023-03-19 RX ORDER — ALPRAZOLAM 0.5 MG/1
1 TABLET ORAL 3 TIMES DAILY
Status: DISCONTINUED | OUTPATIENT
Start: 2023-03-19 | End: 2023-03-21 | Stop reason: HOSPADM

## 2023-03-19 RX ADMIN — LAMOTRIGINE 400 MG: 100 TABLET ORAL at 08:03

## 2023-03-19 RX ADMIN — ALPRAZOLAM 1 MG: 0.5 TABLET ORAL at 01:03

## 2023-03-19 RX ADMIN — ALPRAZOLAM 1 MG: 0.5 TABLET ORAL at 08:03

## 2023-03-19 RX ADMIN — PREGABALIN 75 MG: 75 CAPSULE ORAL at 08:03

## 2023-03-19 RX ADMIN — TRAZODONE HYDROCHLORIDE 200 MG: 100 TABLET ORAL at 08:03

## 2023-03-19 RX ADMIN — MONTELUKAST 10 MG: 10 TABLET, FILM COATED ORAL at 08:03

## 2023-03-19 RX ADMIN — BUPROPION HYDROCHLORIDE 150 MG: 150 TABLET, EXTENDED RELEASE ORAL at 08:03

## 2023-03-19 RX ADMIN — DULOXETINE 120 MG: 30 CAPSULE, DELAYED RELEASE ORAL at 08:03

## 2023-03-19 RX ADMIN — ACETAMINOPHEN 1000 MG: 500 TABLET ORAL at 01:03

## 2023-03-19 RX ADMIN — PREGABALIN 75 MG: 75 CAPSULE ORAL at 02:03

## 2023-03-19 NOTE — PROGRESS NOTES
PSYCHIATRY DAILY INPATIENT PROGRESS NOTE  SUBSEQUENT HOSPITAL VISIT    ENCOUNTER DATE: 3/19/2023  SITE: [facility]    DATE OF ADMISSION: 3/18/2023 12:20 AM  LENGTH OF STAY: 1 days    HISTORY  Maury Smith is a 27 y.o. female, seen during daily ross rounds on the inpatient unit for depression and SI.      Interim Events  The patient was seen and examined. The chart was reviewed. Staff gave report.  She feels depressed, hopeless, and guilty and a burden to her family -because she is not there to help her father. Father is supportive of her receiving help. She is anxious because xanax where ordered as prn. Last night she had panic attack thinking about missing her family and boyfriend-and feeling like her mental illness is a burden.She denies SI and no longer has loud internal dialogue. She feels anxious around people she doesn't know, but has made a friend. Has attended group therapy. Sleeping and eating well. Denies SE.         ROS  General ROS: negative  Ophthalmic ROS: negative  ENT ROS: negative  Respiratory ROS: no cough, shortness of breath, or wheezing  Cardiovascular ROS: no chest pain or dyspnea on exertion  Gastrointestinal ROS: no abdominal pain, change in bowel habits, or black or bloody stools  Genito-Urinary ROS: no dysuria, trouble voiding, or hematuria  Musculoskeletal ROS: negative  Neurological ROS: no TIA or stroke symptoms, headache    DIAGNOSTIC TESTING   Laboratory Results  No results found for this or any previous visit (from the past 24 hour(s)).    VITALS   Vitals:    03/19/23 0800   BP: 115/68   Pulse: 96   Resp: 18   Temp: 97 °F (36.1 °C)        Gait and Station: Stable  Involuntary Movements: none  Muscle Tone: normal  Strength: 5/5 in 4 extremities     MSE:   Alert and oriented x3, fund of knowledge intact x 4 presidents, 3/3 at 5 minute memory, DLROW-attention intact, language fluent english   Appearance: stated age, well groomed, timid  Behavior No psychomotor agitation or  "retardation, good eye contact  Speech normal rate, tone, volume, conversational.    Language fluent English.   Mood "depressed"  Affect constricted, anxious dysphoric , appropriate, mood congruent  Thought process: linear and logical; Associations intact.    Thought content: denies suicidal or homicidal ideation.  Denies auditory hallucinations, paranoid ideation, ideas of reference. +negative thoughts  Insight good  Judgment good           ASSESSMENT/PLAN:   (Diagnosis and Medical Decision Making)     MDD, recurrent, severe  - continue lamictal 400 mg PO qd  - cymbalta 90 to 120 mg PO qd was increased 1.5 months ago  - Added Wellbutrin XL 150mg PO daily --might have headache SE--prn tylenol  - continue psychotherapy with Solange Ayo weekly     JAIMEE  - increased trazodone 150 to 200  mg PO qhs as sleep adjunct 1.5 months ago  - continue melatonin 5-20 mg  PO qhs as sleep adjunct  - continue lyrica 75 mg PO TID  - continue psychotherapy with Solange Ayo weekly        OCD  - increase cymbalta 90 to 120 mg PO qd  - continue psychotherapy with Solangedanica Rollins weekly  - pt counseled    Panic attacks  -  Xanax 1 mg PO TID  - continue psychotherapy with Solange Ayo weekly  - pt counseled        PTSD  - increase cymbalta 90 to 120 mg PO qd 1.5 months ago  - continue psychotherapy with Solange Peñary weekly  - pt counseled        Bulimia Nervosa  - increase cymbalta 90 to 120 mg PO qd 1.5 months ago  - continue psychotherapy with Solange Ayo weekly  - pt counseled  Chart and Labs reviewed. Discussed progress and ongoing symptoms with clinical team.    Estimated remaining hospital day(s):     NEED FOR CONTINUED HOSPITALIZATION  Psychiatric illness continues to pose a potential threat to life or bodily function, of self or others, thereby requiring the need for continued inpatient psychiatric hospitalization: Yes  Protective inpatient pyschiatric hospitalization required while a safe disposition plan is enacted: " Yes  Patient stabilized and ready for discharge from inpatient psychiatric unit: No    STAFF:   Fabiana Hutchins MD  Psychiatry    This note was partially created with M Model word recognition program.  There may be word recognition mistakes that are occasionally missed on review.  Please interpret accordingly.

## 2023-03-19 NOTE — PLAN OF CARE
"Patient states that she feels "sad" today, spending majority of time in activity room interacting appropriately with staff/peers. +tearful. States that she has been talking to her boyfriend and family who is a good support system for her. Denies SI/HI. Denies hallucinations at this time. Appetite adequate. Denies difficulty with sleep. Remains calm and cooperative. NADN. Safety precautions remain in place.  "

## 2023-03-19 NOTE — PLAN OF CARE
Following POC.  Makes needs known.  Denies SI/HI. VH.  Vague AH.  Isolated in her room this evening.  States she had a good day and met some new people.   Medications brought to room.  Medication compliance.  Appetite is good.  Encouraged group attendance.  Free of fall.

## 2023-03-19 NOTE — PLAN OF CARE
Pt is sleeping at this time and has slept 9 hours with 1 awakening.  NAD.  Resp even & unlabored.  Pathways clear.  Q 15 minute safety checks ongoing.  All precautions maintained

## 2023-03-20 PROCEDURE — 25000003 PHARM REV CODE 250: Performed by: PSYCHIATRY & NEUROLOGY

## 2023-03-20 PROCEDURE — 25000003 PHARM REV CODE 250: Performed by: STUDENT IN AN ORGANIZED HEALTH CARE EDUCATION/TRAINING PROGRAM

## 2023-03-20 PROCEDURE — 90833 PR PSYCHOTHERAPY W/PATIENT W/E&M, 30 MIN (ADD ON): ICD-10-PCS | Mod: ,,, | Performed by: STUDENT IN AN ORGANIZED HEALTH CARE EDUCATION/TRAINING PROGRAM

## 2023-03-20 PROCEDURE — 11400000 HC PSYCH PRIVATE ROOM

## 2023-03-20 PROCEDURE — 90833 PSYTX W PT W E/M 30 MIN: CPT | Mod: ,,, | Performed by: STUDENT IN AN ORGANIZED HEALTH CARE EDUCATION/TRAINING PROGRAM

## 2023-03-20 PROCEDURE — 99233 SBSQ HOSP IP/OBS HIGH 50: CPT | Mod: ,,, | Performed by: STUDENT IN AN ORGANIZED HEALTH CARE EDUCATION/TRAINING PROGRAM

## 2023-03-20 PROCEDURE — 99233 PR SUBSEQUENT HOSPITAL CARE,LEVL III: ICD-10-PCS | Mod: ,,, | Performed by: STUDENT IN AN ORGANIZED HEALTH CARE EDUCATION/TRAINING PROGRAM

## 2023-03-20 RX ORDER — ONDANSETRON 4 MG/1
4 TABLET, ORALLY DISINTEGRATING ORAL ONCE
Status: COMPLETED | OUTPATIENT
Start: 2023-03-20 | End: 2023-03-20

## 2023-03-20 RX ORDER — PREGABALIN 75 MG/1
150 CAPSULE ORAL EVERY MORNING
Status: DISCONTINUED | OUTPATIENT
Start: 2023-03-21 | End: 2023-03-21 | Stop reason: HOSPADM

## 2023-03-20 RX ORDER — PREGABALIN 75 MG/1
150 CAPSULE ORAL
Status: DISCONTINUED | OUTPATIENT
Start: 2023-03-20 | End: 2023-03-21 | Stop reason: HOSPADM

## 2023-03-20 RX ADMIN — TRAZODONE HYDROCHLORIDE 200 MG: 100 TABLET ORAL at 08:03

## 2023-03-20 RX ADMIN — DULOXETINE 120 MG: 30 CAPSULE, DELAYED RELEASE ORAL at 09:03

## 2023-03-20 RX ADMIN — ALPRAZOLAM 1 MG: 0.5 TABLET ORAL at 02:03

## 2023-03-20 RX ADMIN — MUPIROCIN: 20 OINTMENT TOPICAL at 08:03

## 2023-03-20 RX ADMIN — PREGABALIN 75 MG: 75 CAPSULE ORAL at 09:03

## 2023-03-20 RX ADMIN — ONDANSETRON 4 MG: 4 TABLET, ORALLY DISINTEGRATING ORAL at 01:03

## 2023-03-20 RX ADMIN — PREGABALIN 150 MG: 75 CAPSULE ORAL at 12:03

## 2023-03-20 RX ADMIN — HYDROXYZINE PAMOATE 50 MG: 50 CAPSULE ORAL at 04:03

## 2023-03-20 RX ADMIN — LAMOTRIGINE 400 MG: 100 TABLET ORAL at 09:03

## 2023-03-20 RX ADMIN — BUPROPION HYDROCHLORIDE 150 MG: 150 TABLET, EXTENDED RELEASE ORAL at 09:03

## 2023-03-20 RX ADMIN — ALPRAZOLAM 1 MG: 0.5 TABLET ORAL at 09:03

## 2023-03-20 RX ADMIN — MONTELUKAST 10 MG: 10 TABLET, FILM COATED ORAL at 08:03

## 2023-03-20 RX ADMIN — MUPIROCIN: 20 OINTMENT TOPICAL at 09:03

## 2023-03-20 RX ADMIN — ALPRAZOLAM 1 MG: 0.5 TABLET ORAL at 08:03

## 2023-03-20 NOTE — PROGRESS NOTES
"   03/20/23 6025   Assessment   Patient's Identification of the Problem Patient reports little anxious" mood, "I think my anxiety is from missing my family." Patient reports he admit is due to "I had a really bad panic attack and felt really suicidal." Patient states "I don't want to hurt myself and I don't want to hurt others. I just feel suicidal. I'm very insecure." Patient denies alcohol, drugs or cigarette use. Patient verbalized her main goal is medication adjustment and self-care, "some days I won't take a shower. I need to have myself as number one priority."   Leisure Interest Reading;Enjoy Family/Friends;Movies;Listen to Music;Video Games;Classical/Table Games  (doing stuff with my mom, like pedicures)   Leisure Barriers Energy Level;Self Confidence;Poor Social Tolerance;Too Busy  (my anxiety and being around groups of people I don't know)   Treatment Focus To Improve Mood;To Improve Leisure Awareness/Lifestyle/Interest;Increase Self Confidence;To Promote Successful and Safe Self Expression;To Improve Coping Skills;To Promote Social Skills/Tolerance/Interaction       Treatment Recommendation:   1:1 Intervention (as needed)    Cognitive Stimulation Skilled Activity  Self Expression Skilled Activity  Mild Exercises Skilled Activity  Stress Management Skilled Activity  Coping Skilled Activity  Leisure Education and Awareness Skilled Activity    Treatment Goal(s):  Long Term Goals Refer To Master Treatment Plan    Short Term Treatment Goal(s)  Patient Will:  Comply with Treatment  Exhibit Improvement in Mood  Demonstrate Constructive Expression of Feelings and Behavior  Verbalize Improvement in Mood    Discharge Recommendations:  Encourage Patient to Actively Utilize Available Community Resources to Increase Leisure Involvement to Decrease Signs and Symptoms of Illness  Encourage Patient to Utilize Coping Skills on a Regular Basis to Reduce the Risk of Decompensating and Re-Hospitalizations  Follow Up with " After Care Appointments  Continue with Current Leisure Activities

## 2023-03-20 NOTE — PROGRESS NOTES
"   03/20/23 4535   [facility] Group Therapy   Group Name Therapeutic Recreation   Specific Interventions Cognitive Stimulation Training   Participation Level Appropriate;Attentive;Sharing   Participation Quality Cooperative;Social   Insight/Motivation Good   Affect/Mood Display Appropriate   Cognition Alert   Psychomotor WNL     Patient reports "little anxious but a little okay" mood, but I'm happy I'm going home tomorrow. Patient shows interest, and initial ability to follow directions, remains involved and enjoys the activity.  "
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
## 2023-03-20 NOTE — PLAN OF CARE
Patient out on unit, interacting with peers. Denies suicidal ideation. Calm and cooperative. Mood improved. Mild anxiety relieved by medications. Accepting meals and meds. No falls, safety maintained.

## 2023-03-20 NOTE — PLAN OF CARE
Nutrition Plan of Care:    Recommendations     Recommendation/Intervention:   1. Continue to encourage po intake and monitor tolerance    2. Recommend ONS: boost BID if po intake <50%   3. Collaboration with medical providers     Goals: Patient to continue consume >50% of EEN prior to RD follow up.  Nutrition Goal Status: goal met  Communication of RD Recs: other (comment) (poc, consults)     Assessment and Plan     No nutritional risk or problem found at this time.  RD to continue to monitor upon follow ups.       Malnutrition Assessment    No malnutrition risk found at this time.  RD to continue to monitor upon follow ups.              Nai Ricks, MS, RDN, LDN

## 2023-03-20 NOTE — PLAN OF CARE
"  Problem: Adult Behavioral Health Plan of Care  Goal: Optimized Coping Skills in Response to Life Stressors  Outcome: Ongoing, Progressing  Intervention: Promote Effective Coping Strategies  Flowsheets (Taken 3/20/2023 4506)  Supportive Measures:   active listening utilized   counseling provided   decision-making supported   verbalization of feelings encouraged   self-reflection promoted   problem-solving facilitated     Group Note:  Behavior:  [facility] met with patient for an individual psychotherapy group. Pt affect flat  with dysphoric mood.    Intervention: [facility] discussed with patient on discharge planning. [facility] encouraged patient to identify ways to cope with and reflect on what are the plans when they are discharged and what coping skills did they learn while in the hospital?    Response:  Pt stated  I learned that I must love myself and to make a schedule so that I cane do things and be happy with myself".    Plan: Continue to encourage group attendance in future group sessions.    "

## 2023-03-20 NOTE — PROGRESS NOTES
"   03/20/23 1040   [facility] Group Therapy   Group Name Other  (what do I want to change skill activity worksheet)   Specific Interventions Coping Skills Training   Participation Level Appropriate;Attentive;Sharing   Participation Quality Cooperative;Social   Insight/Motivation Applies New Skills;Good   Affect/Mood Display Appropriate   Cognition Alert   Psychomotor WNL     Patient shared she want to "self-care/make a routine" and benefits are "do things for myself. I don't like myself." "Making a routine will help me love myself because I'll have time to take care of myself. My family will see improvement." Patient verbalized that she does not want her family to see her "anxiety."  "

## 2023-03-20 NOTE — NURSING
Pt sleeping at this time, slept  7.5 hrs with no awakenings. NAD. Resp even and unlabored.Pathways clear,bed in low position. Q 15 min safety check ongoing.All precautions maintained.

## 2023-03-20 NOTE — CONSULTS
[facility] - Behavioral Health  Adult Nutrition  Consult Note    SUMMARY     Recommendations    Recommendation/Intervention:   1. Continue to encourage po intake and monitor tolerance    2. Recommend ONS: boost BID if po intake <50%   3. Collaboration with medical providers    Goals: Patient to continue consume >50% of EEN prior to RD follow up.  Nutrition Goal Status: goal met  Communication of RD Recs: other (comment) (poc, consults)    Assessment and Plan    No nutritional risk or problem found at this time.  RD to continue to monitor upon follow ups.      Malnutrition Assessment       No malnutrition risk found at this time.  RD to continue to monitor upon follow ups.                                Reason for Assessment    Reason For Assessment: consult  Diagnosis: psychological disorder  Patient Active Problem List   Diagnosis    PCOS (polycystic ovarian syndrome)    JAIMEE (generalized anxiety disorder)    Microscopic hematuria    Perineal neuralgia    Pelvic pain in female    Alcoholic intoxication without complication    Fall    Facial laceration    Contusion of nose    Chest pain    Stress reaction    Acute post-traumatic headache, not intractable    Severe episode of recurrent major depressive disorder    Panic attacks    Insomnia       Interdisciplinary Rounds: did not attend (RD remote)    General Information Comments:   3/20/2023: Patient on a regular diet with fair to good intake.  No tolerance issues reported at this time.  Meal consupmtions noted between %.  LBM: MARCIE previous recorded BM:3/7/2023.  Labs reviewed.  NKFA.  RD to continue to monitor po intake and tolerance upon follow ups.    Nutrition Discharge Planning: Patient to continue with regular diet post discharge.    Nutrition Risk Screen    Nutrition Risk Screen: no indicators present    Nutrition/Diet History    Spiritual, Cultural Beliefs, Rastafari Practices, Values that Affect Care: no  Food Allergies: NKFA  Factors Affecting  Nutritional Intake: None identified at this time    Anthropometrics    Temp: 97.9 °F (36.6 °C)  Height Method: Stated  Height: 5' (152.4 cm)  Height (inches): 60 in  Weight Method: Standard Scale  Weight: 79.9 kg (176 lb 4.1 oz)  Weight (lb): 176.26 lb  Ideal Body Weight (IBW), Female: 100 lb  % Ideal Body Weight, Female (lb): 176.26 %  BMI (Calculated): 34.4  BMI Grade: 30 - 34.9- obesity - grade I       Lab/Procedures/Meds    Pertinent Labs Reviewed: reviewed  Pertinent Medications Reviewed: reviewed  BMP  Lab Results   Component Value Date     03/17/2023    K 4.3 03/17/2023     03/17/2023    CO2 26 03/17/2023    BUN 17 03/17/2023    CREATININE 0.65 (L) 03/17/2023    CALCIUM 9.8 03/17/2023    ANIONGAP 13 03/17/2023    EGFRNORACEVR >60 03/17/2023     Lab Results   Component Value Date    HGBA1C 5.2 03/18/2023     Lab Results   Component Value Date    CALCIUM 9.8 03/17/2023     Scheduled Meds:   ALPRAZolam  1 mg Oral TID    buPROPion  150 mg Oral Daily    DULoxetine  120 mg Oral Daily    lamoTRIgine  400 mg Oral Daily    montelukast  10 mg Oral QHS    mupirocin   Nasal BID    pregabalin  75 mg Oral TID    traZODone  200 mg Oral QHS     Continuous Infusions:  PRN Meds:.acetaminophen, hydrOXYzine pamoate, nicotine, OLANZapine **AND** OLANZapine    Physical Findings/Assessment         Estimated/Assessed Needs    Weight Used For Calorie Calculations: 79.9 kg (176 lb 2.4 oz)  Energy Calorie Requirements (kcal): 20-25kcals/kg (1598-1997kcals/day)  Energy Need Method: Kcal/kg  Protein Requirements: 0.8-1.0g/kg  Weight Used For Protein Calculations: 79.9 kg (176 lb 2.4 oz)     Estimated Fluid Requirement Method: RDA Method  RDA Method (mL): 20  CHO Requirement: 200-250      Nutrition Prescription Ordered    Current Diet Order: Regular    Evaluation of Received Nutrient/Fluid Intake  Last Bowel Movement: 03/07/23 (questionable)  % Kcal Needs: %  % Protein Needs: %  I/O: MARCIE  Energy Calories Required:  meeting needs  Protein Required: meeting needs  Fluid Required: meeting needs  Tolerance: tolerating  % Intake of Estimated Energy Needs: 50 - 75 %  % Meal Intake: 50 - 75 %  No intake or output data in the 24 hours ending 03/20/23 0938   Nutrition Risk    Level of Risk/Frequency of Follow-up: low       Monitor and Evaluation    Food and Nutrient Intake: energy intake, food and beverage intake  Food and Nutrient Adminstration: diet order  Knowledge/Beliefs/Attitudes: other (specify)  Physical Activity and Function: nutrition-related ADLs and IADLs, factors affecting access to physical activity  Anthropometric Measurements: body mass index, weight change, weight, height/length  Biochemical Data, Medical Tests and Procedures: inflammatory profile, glucose/endocrine profile, gastrointestinal profile, electrolyte and renal panel, lipid profile       Nutrition Follow-Up    RD Follow-up?: Yes  Nai Ricks, MS, RDN, LDN

## 2023-03-20 NOTE — PROGRESS NOTES
"PSYCHIATRY DAILY INPATIENT PROGRESS NOTE  SUBSEQUENT HOSPITAL VISIT    ENCOUNTER DATE: 3/20/2023  S[facility] [place]    DATE OF ADMISSION: 3/18/2023 12:20 AM  LENGTH OF STAY: 2 days      CHIEF COMPLAINT   Maury Smith is a 27 y.o. female, seen during daily ross rounds on the inpatient unit.  Maury Smith presented with the chief complaint of depression and anxiety      The patient was seen and examined. The chart was reviewed.     Reviewed notes from Rns, MD, and RD and labs from the last 24 hours.    The patient's case was discussed with the treatment team/care providers today including Rns and LPC    Staff reports no behavioral or management issues.     The patient has been compliant with treatment.      Subjective 03/20/2023       Today the patient reports she is feeling much better, no longer having AH or SI. She states she is receiving support from peers and is learning about CBT. Discussed IOP however pt does not want to miss work. Counseled patient extensively on prioritizing her mental health.       The patient reports side effects of  "dry mouth from wellbutrin" .        Interim/overnight events per report/notes:          Psychiatric ROS (observed, reported, or endorsed/denied):  Depressed mood - less  Interest/pleasure/anhedonia: less  Guilt/hopelessness/worthlessness - less  Changes in Sleep - less  Changes in Appetite - less  Changes in Concentration - less  Changes in Energy - less  PMA/R- less  Suicidal- active/passive ideations - denies  Homicidal ideations: active/passive ideations - No    Hallucinations - denies  Delusions - No  Disorganized behavior - No  Disorganized speech - No  Negative symptoms - No    Elevated mood - No  Decreased need for sleep - No  Grandiosity - No  Racing thoughts - No  Impulsivity - No  Irritability- No  Increased energy - No  Distractibility - No  Increase in goal-directed activity or PMA- No    Symptoms of JAIMEE - fluctuating  Symptoms of Panic Disorder- " fluctuating  Symptoms of PTSD - fluctuating        Overall progress: Patient is showing moderate improvement        Psychotherapy:  Target symptoms: depression, anxiety   Why chosen therapy is appropriate versus another modality: relevant to diagnosis  Outcome monitoring methods: self-report  Therapeutic intervention type: supportive psychotherapy  Topics discussed/themes: work stress, building skills sets for symptom management, symptom recognition  The patient's response to the intervention is accepting. The patient's progress toward treatment goals is fair.   Duration of intervention: 16 minutes.        Medical ROS  Review of Systems   Constitutional:  Negative for chills and fever.   HENT:  Negative for hearing loss.    Eyes:  Negative for blurred vision and double vision.   Respiratory:  Negative for shortness of breath.    Cardiovascular:  Negative for chest pain and palpitations.   Gastrointestinal:  Negative for constipation, diarrhea, nausea and vomiting.   Genitourinary:  Negative for dysuria.   Musculoskeletal:  Negative for back pain and neck pain.   Neurological:  Negative for dizziness and headaches.   Endo/Heme/Allergies:  Negative for environmental allergies.       PAST MEDICAL HISTORY   Past Medical History:   Diagnosis Date    Anxiety     Depression     Endometriosis     Hx of psychiatric care     IC (interstitial cystitis)     Obsessive-compulsive disorder     Psychiatric problem     PTSD (post-traumatic stress disorder)     Therapy            PSYCHOTROPIC MEDICATIONS   Scheduled Meds:   ALPRAZolam  1 mg Oral TID    buPROPion  150 mg Oral Daily    DULoxetine  120 mg Oral Daily    lamoTRIgine  400 mg Oral Daily    montelukast  10 mg Oral QHS    mupirocin   Nasal BID    pregabalin  75 mg Oral TID    traZODone  200 mg Oral QHS     Continuous Infusions:  PRN Meds:.acetaminophen, hydrOXYzine pamoate, nicotine, OLANZapine **AND** OLANZapine        EXAMINATION    VITALS   Vitals:    03/19/23 1933  03/20/23 0755 03/20/23 0928 03/20/23 0931   BP: 118/78 117/69     BP Location: Left arm Right arm     Patient Position: Sitting Lying     Pulse: 89 86     Resp: 18 18     Temp: 97.8 °F (36.6 °C) 97.9 °F (36.6 °C) 97.9 °F (36.6 °C)    TempSrc: Temporal Temporal     SpO2:       Weight:   79.9 kg (176 lb 4.1 oz)    Height:   5' (1.524 m) 5' (1.524 m)       Body mass index is 34.42 kg/m².        CONSTITUTIONAL  General Appearance: unremarkable, age appropriate    MUSCULOSKELETAL  Muscle Strength and Tone:no tremor, no tic  Abnormal Involuntary Movements: No  Gait and Station: non-ataxic    PSYCHIATRIC   Level of Consciousness: awake and alert   Orientation: person, place, and situation  Grooming: Casually dressed and Well groomed  Psychomotor Behavior: normal, cooperative  Speech: normal tone, normal rate, normal pitch, normal volume  Language: grossly intact  Mood: fine  Affect: Consistent with mood  Thought Process: linear, logical  Associations: intact   Thought Content: denies SI, denies HI, and no delusions  Perceptions: denies AH and denies  VH  Memory: Able to recall past events, Remote intact, and Recent intact  Attention:Attends to interview without distraction  Fund of Knowledge: Aware of current events and Vocabulary appropriate   Estimate if Intelligence:  Average based on work/education history, vocabulary and mental status exam  Insight: has awareness of illness  Judgment: behavior is adequate to circumstances        DIAGNOSTIC TESTING   Laboratory Results  No results found for this or any previous visit (from the past 24 hour(s)).          MEDICAL DECISION MAKING      ASSESSMENT:   MDD, recurrent, severe  JAIMEE  Panic attacks  PTSD  OCD  Bulimia         PROBLEM LIST AND MANAGEMENT PLANS        MDD, recurrent, severe  - continue lamictal 400 mg PO qd  - cymbalta 90 to 120 mg PO qd was increased 1.5 months ago  - Added Wellbutrin XL 150mg PO daily -stop 2/2 eating disorder  - continue psychotherapy with  Solange Ayo weekly     JAIMEE  - increased trazodone 150 to 200  mg PO qhs as sleep adjunct 1.5 months ago  - continue melatonin 5-20 mg  PO qhs as sleep adjunct  - continue lyrica 75 mg PO TID -increase to 150 mg PO BID  - continue psychotherapy with Solange Ayo weekly        OCD  - increase cymbalta 90 to 120 mg PO qd  - continue psychotherapy with Solange Ayo weekly  - pt counseled    Panic attacks  -  Xanax 1 mg PO TID  - continue psychotherapy with Solange Ayo weekly  - pt counseled        PTSD  - increase cymbalta 90 to 120 mg PO qd 1.5 months ago  - continue psychotherapy with Solange Ayo weekly  - pt counseled        Bulimia Nervosa  - increase cymbalta 90 to 120 mg PO qd 1.5 months ago  - continue psychotherapy with Solange Ayo weekly  - pt counseled            Discussed diagnosis, risks and benefits of proposed treatment vs alternative treatments vs no treatment, potential side effects of these treatments and the inherent unpredictability of treatment. The patient expresses understanding of the above and displays the capacity to agree with this treatment given said understanding. Patient also agrees that, currently, the benefits outweigh the risks and would like to pursue/continue treatment at this time.    Any medications being used off-label were discussed with the patient inclusive of the evidence base for the use of the medications and consent was obtained for the off-label use of the medication.       DISCHARGE PLANNING  Expected Disposition Plan: Home or Self Care      NEED FOR CONTINUED HOSPITALIZATION  Psychiatric illness continues to pose a potential threat to life or bodily function, of self or others, thereby requiring the need for continued inpatient psychiatric hospitalization: Yes, due to: danger to self, as evidenced by:  Concerns with SI--Fading.    Protective inpatient pyschiatric hospitalization required while a safe disposition plan is enacted: Yes    Patient stabilized  and ready for discharge from inpatient psychiatric unit: No        STAFF:   Axel Villafana III, MD  Psychiatry

## 2023-03-21 VITALS
RESPIRATION RATE: 18 BRPM | HEART RATE: 87 BPM | DIASTOLIC BLOOD PRESSURE: 67 MMHG | SYSTOLIC BLOOD PRESSURE: 116 MMHG | BODY MASS INDEX: 34.6 KG/M2 | WEIGHT: 176.25 LBS | OXYGEN SATURATION: 94 % | HEIGHT: 60 IN | TEMPERATURE: 98 F

## 2023-03-21 PROBLEM — F32.A DEPRESSION WITH SUICIDAL IDEATION: Status: ACTIVE | Noted: 2023-03-21

## 2023-03-21 PROBLEM — R45.851 DEPRESSION WITH SUICIDAL IDEATION: Status: ACTIVE | Noted: 2023-03-21

## 2023-03-21 PROCEDURE — 90833 PSYTX W PT W E/M 30 MIN: CPT | Mod: ,,, | Performed by: STUDENT IN AN ORGANIZED HEALTH CARE EDUCATION/TRAINING PROGRAM

## 2023-03-21 PROCEDURE — 99239 PR HOSPITAL DISCHARGE DAY,>30 MIN: ICD-10-PCS | Mod: ,,, | Performed by: STUDENT IN AN ORGANIZED HEALTH CARE EDUCATION/TRAINING PROGRAM

## 2023-03-21 PROCEDURE — 90833 PR PSYCHOTHERAPY W/PATIENT W/E&M, 30 MIN (ADD ON): ICD-10-PCS | Mod: ,,, | Performed by: STUDENT IN AN ORGANIZED HEALTH CARE EDUCATION/TRAINING PROGRAM

## 2023-03-21 PROCEDURE — 25000003 PHARM REV CODE 250: Performed by: PSYCHIATRY & NEUROLOGY

## 2023-03-21 PROCEDURE — 99239 HOSP IP/OBS DSCHRG MGMT >30: CPT | Mod: ,,, | Performed by: STUDENT IN AN ORGANIZED HEALTH CARE EDUCATION/TRAINING PROGRAM

## 2023-03-21 RX ORDER — PREGABALIN 150 MG/1
150 CAPSULE ORAL 2 TIMES DAILY
Qty: 60 CAPSULE | Refills: 2 | Status: SHIPPED | OUTPATIENT
Start: 2023-03-21 | End: 2023-03-29 | Stop reason: SDUPTHER

## 2023-03-21 RX ADMIN — DULOXETINE 120 MG: 30 CAPSULE, DELAYED RELEASE ORAL at 08:03

## 2023-03-21 RX ADMIN — HYDROXYZINE PAMOATE 50 MG: 50 CAPSULE ORAL at 04:03

## 2023-03-21 RX ADMIN — MUPIROCIN: 20 OINTMENT TOPICAL at 08:03

## 2023-03-21 RX ADMIN — PREGABALIN 150 MG: 75 CAPSULE ORAL at 12:03

## 2023-03-21 RX ADMIN — ALPRAZOLAM 1 MG: 0.5 TABLET ORAL at 08:03

## 2023-03-21 RX ADMIN — PREGABALIN 150 MG: 75 CAPSULE ORAL at 06:03

## 2023-03-21 RX ADMIN — LAMOTRIGINE 400 MG: 100 TABLET ORAL at 08:03

## 2023-03-21 NOTE — PLAN OF CARE
Lying quietly in bed, eyes closed, respirations even, unlabored. Apparently asleep. Slept 5.5 hours thus far with 3 interruptions. Safety precautions maintained. Rounds done every 15 minutes. Bed is fixed in low position and room is uncluttered and pathways are clear.

## 2023-03-21 NOTE — PSYCH
Spoke with the  in the specialty clinic, related that the  will call [facility]  with the aftercare appointment later today.

## 2023-03-21 NOTE — PSYCH
The patient will follow up with [facility], Dr Villafana, [address] [place] [address]. [id number], [phone]. An appointment has been scheduled on 3/28/2023 at 10am for medication management.. the patient is a smoker, smoking cessation will be addressed during the scheduled appointment. The UDS indicated that the patient was only positive for medications that were prescribed to her; therefore, treatment for substance abuse is not indicated. The after visit summary was not faxed as the provider has access to the patient's medical records through [employer].

## 2023-03-21 NOTE — PROGRESS NOTES
Psychotherapy:  Target symptoms: depression, anxiety   Why chosen therapy is appropriate versus another modality: relevant to diagnosis  Outcome monitoring methods: self-report  Therapeutic intervention type: supportive psychotherapy  Topics discussed/themes: work stress, building skills sets for symptom management, symptom recognition  The patient's response to the intervention is accepting. The patient's progress toward treatment goals is good.   Duration of intervention: 16 minutes.

## 2023-03-21 NOTE — DISCHARGE SUMMARY
"Discharge Summary  Psychiatry    Admit Date: 3/18/2023    Discharge Date and Time:  03/21/2023 11:47 AM    Attending Physician: Fabiana Hutchins MD     Discharge Provider: Axel Villafana III    Reason for Admission:  STEFANY Hitchcock    History of Present Illness:   History of Present Illness:      Maury Smith is a 27 y.o. female with a past psychiatric history of MDD, JAIMEE, Panic Attacks, PTSD, OCD, and Bulimia currently admitted to the inpatient unit for worsening depression in setting of increased work stress. 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 She has difficulty taking breaks at work, asking for help, and feels guilty that she would need to receive help from her father. Her father would be happy to help and wants her to take breaks, but she has difficulty letting go of control and has high levels of guilt regard this situation and including wanting to be able to help her boyfriend deal with his graduate school stress.      She notes that she has loud internal dialogue recently which express "the world would be better off if you were alive. She has trouble staying asleep, she wont take time to eat,but appetite is stable. She states internal dialogue causes her problems with concentration. She cognitively understands that things are not hopeless, but does feel hopeless and overwhelmed.        Procedures Performed: * No surgery found *    Hospital Course:    Patient was admitted to the inpatient psychiatry unit after being medically cleared in the ED. Chart and labs were reviewed. The patient was stabilized as follows:      MDD, recurrent, severe  - continue lamictal 400 mg PO qd  - cymbalta 90 to 120 mg PO qd was increased 1.5 months ago  - Added Wellbutrin XL 150mg PO daily -stop 2/2 eating disorder  - continue psychotherapy with Solange sweeney     JAIMEE  - increased trazodone 150 to 200  mg PO qhs as " sleep adjunct 1.5 months ago  - continue melatonin 5-20 mg  PO qhs as sleep adjunct  - continue lyrica 75 mg PO TID -increase to 150 mg PO BID  - continue psychotherapy with Solange Ayo weekly        OCD  - increase cymbalta 90 to 120 mg PO qd  - continue psychotherapy with Oslange Ayo weekly  - pt counseled    Panic attacks  -  Xanax 1 mg PO TID  - continue psychotherapy with Solange Ayo weekly  - pt counseled        PTSD  - increase cymbalta 90 to 120 mg PO qd 1.5 months ago  - continue psychotherapy with Solange Ayo weekly  - pt counseled        Bulimia Nervosa  - increase cymbalta 90 to 120 mg PO qd 1.5 months ago  - continue psychotherapy with Solange Ayo weekly  - pt counseled           During hospitalization, the patient was encouraged to go to both groups and individual counseling. Patient was monitored for any side effects. A meeting was held with multidisciplinary team prior to discharge and pt's diagnosis, current medications, and follow up were discussed. The patient has been compliant with treatment and can adequately attend to activities of daily living in an independent manner. The patient denies any side effects. The patient denies SI, HI, plan or intent for self harm or harm to others. The patient is no longer a danger to self or others nor gravely disabled disabled. Patient discharged  in stable condition with scheduled outpatient follow up.      Discussed diagnosis, risks and benefits of proposed treatment vs alternative treatments vs no treatment, and potential side effects of these treatments.  The patient expresses understanding of the above and displays the capacity to agree with this treatment given said understanding.  Patient also agrees that, currently, the benefits outweigh the risks and would like to pursue treatment at this time.      Discharge MSE: stated age, casually dressed, well groomed.  No psychomotor agitation or retardation.  No abnormal involuntary movements.   Gait normal.  Speech normal, conversational.  Language fluent English. Mood fine.  Affect normal range, pleasant, euthymic.  Thought process linear.  Associations intact.  Denies suicidal or homicidal ideation.  Denies auditory hallucinations, paranoid ideation, ideas of reference.  Memory intact.  Attention intact.  Fund of knowledge intact.  Insight intact.  Judgment intact.  Alert and oriented to person, place, time.      Tobacco Usage:  Is patient a smoker? No  Does patient want prescription for Tobacco Cessation? No  Does patient want counseling for Tobacco Cessation? No    If patient would like to quit, then over the counter nicotine patch could be used. The patient could also follow up with his PCP or psychiatric provider for other alternatives.     Final Diagnoses:    Principal Problem: MDD, recurrent, severe   Secondary Diagnoses:     JAIMEE  Panic attacks  PTSD  OCD  Bulimia     Labs:  Admission on 03/18/2023   Component Date Value Ref Range Status    Hemoglobin A1C 03/18/2023 5.2  4.0 - 5.6 % Final    Estimated Avg Glucose 03/18/2023 103  68 - 131 mg/dL Final    Cholesterol 03/18/2023 235 (H)  120 - 199 mg/dL Final    Triglycerides 03/18/2023 110  30 - 150 mg/dL Final    HDL 03/18/2023 47  40 - 75 mg/dL Final    LDL Cholesterol 03/18/2023 166.0 (H)  63.0 - 159.0 mg/dL Final    HDL/Cholesterol Ratio 03/18/2023 20.0  20.0 - 50.0 % Final    Total Cholesterol/HDL Ratio 03/18/2023 5.0  2.0 - 5.0 Final    Non-HDL Cholesterol 03/18/2023 188  mg/dL Final   Admission on 03/17/2023, Discharged on 03/18/2023   Component Date Value Ref Range Status    Alcohol, Serum 03/17/2023 <10  <10 mg/dL Final    Benzodiazepines 03/17/2023 Positive (See Note) (A)  Negative Final    Cocaine (Metab.) 03/17/2023 Negative  Negative Final    Opiate Scrn, Ur 03/17/2023 Positive (See Note) (A)  Negative Final    Barbiturate Screen, Ur 03/17/2023 Negative  Negative Final    Amphetamine Screen, Ur 03/17/2023 Negative  Negative Final    THC  03/17/2023 Negative  Negative Final    Phencyclidine 03/17/2023 Negative  Negative Final    Tricyclic Antidepressants (TCA), U* 03/17/2023 Negative  Negative Final    Toxicology Information 03/17/2023 SEE COMMENT   Final    WBC 03/17/2023 6.60  3.90 - 12.70 K/uL Final    RBC 03/17/2023 4.41  4.00 - 5.40 M/uL Final    Hemoglobin 03/17/2023 13.3  12.0 - 16.0 g/dL Final    Hematocrit 03/17/2023 39.3  37.0 - 48.5 % Final    MCV 03/17/2023 89  82 - 98 fL Final    MCH 03/17/2023 30.2  27.0 - 31.0 pg Final    MCHC 03/17/2023 33.8  32.0 - 36.0 g/dL Final    RDW 03/17/2023 12.6  11.5 - 14.5 % Final    Platelets 03/17/2023 245  150 - 450 K/uL Final    MPV 03/17/2023 7.2 (L)  7.4 - 10.4 fL Final    Gran # (ANC) 03/17/2023 3.7  1.8 - 7.7 K/uL Final    Lymph # 03/17/2023 2.4  1.0 - 4.8 K/uL Final    Mono # 03/17/2023 0.5  0.3 - 1.0 K/uL Final    Eos # 03/17/2023 0.1  0.0 - 0.5 K/uL Final    Baso # 03/17/2023 0.00  0.00 - 0.20 K/uL Final    nRBC 03/17/2023 0  0 /100 WBC Final    Gran % 03/17/2023 55.4  38.0 - 73.0 % Final    Lymph % 03/17/2023 35.6  18.0 - 48.0 % Final    Mono % 03/17/2023 6.9  4.0 - 15.0 % Final    Eosinophil % 03/17/2023 1.7  0.0 - 8.0 % Final    Basophil % 03/17/2023 0.4  0.0 - 1.9 % Final    Differential Method 03/17/2023 Automated   Final    Sodium 03/17/2023 143  136 - 145 mmol/L Final    Potassium 03/17/2023 4.3  3.5 - 5.1 mmol/L Final    Chloride 03/17/2023 104  95 - 110 mmol/L Final    CO2 03/17/2023 26  23 - 29 mmol/L Final    Glucose 03/17/2023 98  74 - 106 mg/dL Final    BUN 03/17/2023 17  7 - 17 mg/dL Final    Creatinine 03/17/2023 0.65 (L)  0.70 - 1.20 mg/dL Final    Calcium 03/17/2023 9.8  8.4 - 10.2 mg/dL Final    Total Protein 03/17/2023 8.5 (H)  6.3 - 8.2 g/dL Final    Albumin 03/17/2023 4.9  3.5 - 5.2 g/dL Final    Total Bilirubin 03/17/2023 0.4  0.2 - 1.3 mg/dL Final    Alkaline Phosphatase 03/17/2023 91  38 - 145 U/L Final    AST 03/17/2023 31  14 - 36 U/L Final    ALT 03/17/2023 36  10 - 44  U/L Final    Anion Gap 03/17/2023 13  8 - 16 mmol/L Final    eGFR 03/17/2023 >60  >60 mL/min/1.73 m^2 Final    RPR 03/17/2023 Non-reactive  Non-reactive Final    Salicylate Lvl 03/17/2023 <1  mg/dL Final    Acetaminophen (Tylenol), Serum 03/17/2023 <10.0  10.0 - 20.0 ug/mL Final    Specimen UA 03/17/2023 Urine, Clean Catch   Final    Color, UA 03/17/2023 Colorless (A)  Yellow, Straw, [place] Final    Appearance, UA 03/17/2023 Clear  Clear Final    pH, UA 03/17/2023 7.0  5.0 - 9.0 Final    Specific Gravity, UA 03/17/2023 1.010   Final    Protein, UA 03/17/2023 Negative  Negative Final    Glucose, UA 03/17/2023 Negative  Negative Final    Ketones, UA 03/17/2023 Negative  Negative Final    Bilirubin (UA) 03/17/2023 Negative  Negative Final    Occult Blood UA 03/17/2023 Negative  Negative Final    Nitrite, UA 03/17/2023 Negative  Negative Final    Urobilinogen, UA 03/17/2023 Negative  Negative EU/dL Final    Leukocytes, UA 03/17/2023 Negative  Negative Final    Preg Test, Ur 03/17/2023 Negative   Final    SARS-CoV2 (COVID-19) Qualitative P* 03/17/2023 Negative  Negative Final         Discharged Condition: stable and improved; not currently a danger to self/others or gravely disabled    Disposition: Home or Self Care    Is patient being discharged on multiple neuroleptics? No    Follow Up/Patient Instructions:     Take all medications as prescribed.  Attend all psychiatric and medical follow up appointments.   Abstain from all drugs and alcohol.  Call the crisis line at: [phone] for help in a crisis and emergent situations or call 911 and Return to ED for any acute worsening of your condition including suicidal or homicidal ideations      Discharge Procedure Orders   Diet Adult Regular     Notify your health care provider if you experience any of the following:  temperature >100.4     Notify your health care provider if you experience any of the following:  persistent nausea and vomiting or diarrhea     Notify your  health care provider if you experience any of the following:   Order Comments: Suicidal thoughts, homicidal thoughts, or any other changes in mental status     Notify your health care provider if you experience any of the following:  increased confusion or weakness     Notify your health care provider if you experience any of the following:  persistent dizziness, light-headedness, or visual disturbances     Activity as tolerated      Follow-up Information       Axel Villafana III, MD Follow up on 3/21/2023.    Specialty: Psychiatry  Contact information:  [address] [place] [id number] [phone]                               Follow up apt: see above      Medications:  Reconciled Home Medications:      Medication List        CHANGE how you take these medications      pregabalin 150 MG capsule  Commonly known as: LYRICA  Take 1 capsule (150 mg total) by mouth 2 (two) times daily.  What changed:   medication strength  how much to take  when to take this     SAXENDA 3 mg/0.5 mL (18 mg/3 mL) Pnij  Generic drug: liraglutide (weight loss)  Please inject 0.6mg subcutaneously daily x one week  Week two increase to 1.2mg SC daily x one week   Week three increase to 1.8mg Sc daily x one week   Week four increase to 2.4 mg SC daily x one week  Week Five increase to 3.0 mg  What changed:   how much to take  how to take this  when to take this  additional instructions            CONTINUE taking these medications      ALPRAZolam 1 MG tablet  Commonly known as: XANAX  Take 1 tablet (1 mg total) by mouth 3 (three) times daily as needed for Anxiety.     drospirenone-ethinyl estradioL 3-0.02 mg per tablet  Commonly known as: ANGELO  Take 1 tablet by mouth once daily.     DULoxetine 60 MG capsule  Commonly known as: CYMBALTA  Take 2 capsules (120 mg total) by mouth once daily.     lamoTRIgine 200 MG tablet  Commonly known as: LAMICTAL  Take 2 tablets (400 mg total) by mouth once daily.     montelukast 10 mg tablet  Commonly  "known as: SINGULAIR  TAKE 1 TABLET(10 MG) BY MOUTH EVERY EVENING     pen needle, diabetic 31 gauge x 5/16" Ndle  Commonly known as: EASY COMFORT PEN NEEDLES  1 Device by Misc.(Non-Drug; Combo Route) route once daily.     traZODone 100 MG tablet  Commonly known as: DESYREL  Take 2 tablets (200 mg total) by mouth every evening.                Diet: regular     Activity as tolerated    Total time spent discharging patient: 35 minutes    Axel Villafana III, MD  Psychiatry    "

## 2023-03-21 NOTE — NURSING
Patient ride is here to pick her up. Patient reports all her belongings are with patient. Patient denies any needs. Assisted patient off unit with staff and her belongings. Prescriptions and copy of AVS given to patient.

## 2023-03-21 NOTE — NURSING
Patient discharge instructions given to patient. All aspects of AVS reviewed with patient. Prescriptions and all meds reviewed with patient. Denies any questions or needs. Ready for discharge. Patient called mom to pick her up.

## 2023-03-21 NOTE — PROGRESS NOTES
"   03/21/23 1000   [facility] Therapy   Group Name Therapeutic Recreation   Specific Interventions Cognitive Stimulation Training   Participation Level Appropriate;Attentive;Sharing   Participation Quality Cooperative;Social   Insight/Motivation Good   Affect/Mood Display Appropriate   Cognition Alert   Psychomotor WNL     Patient states "I'm a little anxious to see my family and boyfriend but super happy." Patient shared her discharge coping skills ar to plan ahead, have a routine and self-care.  "

## 2023-03-21 NOTE — PLAN OF CARE
Problem: Adult Behavioral Health Plan of Care  Goal: Rounds/Family Conference  Outcome: Ongoing, Progressing  Flowsheets (Taken 3/21/2023 1133)  Participants:   psychiatrist   nursing   therapeutic recreation   other ([facility] and discharge planner)     TREATMENT TEAM      Chief Complaint:    Pt is a 27 year old female with chief complaints of depression and anxiety    Pt states she was having a panic attack and her therapist told her to come  to the hospital.      Pt Goal(s):    Pt states she wants to go to an IOP upon discharge.    Current Progress:   Pt attended treatment team dressed in personal clothing.    Pt affect consistent with mood/ anxious mood    Pt staets she hears voices when she is more anxious and the voices tell her to hurt herself and has not had any voices since she has been here.   Pt states her anxiety comes and goes.   Program/groups:    Encourage pt to continue to attend all groups      Revisions to Plan:  Encourage pt to attend treatment team and to attend all groups. Recommendations are for pt to attend IOP and therapist.

## 2023-03-21 NOTE — PSYCH
[facility] discussed with pt on collateral consent. Pt signed collateral consent for  Allen Smith/father [phone]. [facility] attemtped to contact collateral consent to discuss pt admission. Father stated:        Reason for admission- describe what happened?  She has been kind of really stressed and had a panic attack and she called there therapist or psychiatrist and they told her to go to the hospital.       Prior treatment places and dates-doctor name and location  Reason for prior treatment- same or different  How long has patient had problem(s)?    She has been having this sine about 4 years ago after she got  and then got  and that was the trigger. This is the first time she has been hospitalized.     Substance abuse- what , how long, how much, how often?  None to my knowledge.   Legal Issues- Current charges, type of offense, probation or parole?    History of suicide attempts- when and what methods, did they require medical attention?  Nothing suicidal   Alcohol Use-  What preference of alcohol, how much, how long, how often?  none  History of violence-describe behaviors and triggers  She is not a violent person.  Any guns or weapons in the home? If yes, recommend that these be secured.  I do have one gun. I will make sure that it is locked up in  a safe place.   What is patients baseline behavior/mood- how well do they know if patient is doing well?  I will ask her and the way she is acting with her behavior.   What helps the patient stay well?  Internal/external coping strategies ( attending meetings, going to groups, taking medications, spending time with family ( etc)?  Spending time with her family and friends.   Discharge plan:    Where will the patient live upon discharge?  She will go to her apartment for one day and then she will come back to our house.   Who else in the home?  Her and her boyfriend and myself and my wife.   Will someone be able to pick the patient up when discharged?   Her  mother will come to pick her up.

## 2023-03-21 NOTE — PLAN OF CARE
Plan of care reviewed. Denies intent to harm self or others at this time. Denies hallucinations and delusions. States is feeling much better and is thinking of ways to avoid becoming overwhelmed at work so she can avoid panic attacks. Accepts snacks and medications. Gait steady, no falls. Interacting with staff and peers. Promoted individualized safety plan, reality-based interactions, effective coping strategies, and impulse control. Will continue precautions and monitor for safety.

## 2023-03-22 ENCOUNTER — PATIENT OUTREACH (OUTPATIENT)
Dept: ADMINISTRATIVE | Facility: CLINIC | Age: 28
End: 2023-03-22
Payer: COMMERCIAL

## 2023-03-22 NOTE — PROGRESS NOTES
C3 nurse spoke with Maury Smith for a TCC post hospital discharge follow up call. The patient has a scheduled HOSFU appointment with Axel Villafana on 03/29/2023 @ 1000.

## 2023-03-24 ENCOUNTER — PATIENT MESSAGE (OUTPATIENT)
Dept: PSYCHIATRY | Facility: CLINIC | Age: 28
End: 2023-03-24
Payer: COMMERCIAL

## 2023-03-29 ENCOUNTER — OFFICE VISIT (OUTPATIENT)
Dept: PSYCHIATRY | Facility: CLINIC | Age: 28
End: 2023-03-29
Payer: COMMERCIAL

## 2023-03-29 VITALS
SYSTOLIC BLOOD PRESSURE: 106 MMHG | DIASTOLIC BLOOD PRESSURE: 71 MMHG | HEART RATE: 94 BPM | OXYGEN SATURATION: 98 % | HEIGHT: 60 IN | RESPIRATION RATE: 17 BRPM | WEIGHT: 179.44 LBS | BODY MASS INDEX: 35.23 KG/M2

## 2023-03-29 DIAGNOSIS — G47.00 INSOMNIA, UNSPECIFIED TYPE: ICD-10-CM

## 2023-03-29 DIAGNOSIS — F41.0 PANIC ATTACKS: ICD-10-CM

## 2023-03-29 DIAGNOSIS — F33.2 SEVERE EPISODE OF RECURRENT MAJOR DEPRESSIVE DISORDER, WITHOUT PSYCHOTIC FEATURES: Primary | ICD-10-CM

## 2023-03-29 DIAGNOSIS — F41.1 GAD (GENERALIZED ANXIETY DISORDER): ICD-10-CM

## 2023-03-29 PROCEDURE — 3008F BODY MASS INDEX DOCD: CPT | Mod: CPTII,S$GLB,, | Performed by: STUDENT IN AN ORGANIZED HEALTH CARE EDUCATION/TRAINING PROGRAM

## 2023-03-29 PROCEDURE — 1160F PR REVIEW ALL MEDS BY PRESCRIBER/CLIN PHARMACIST DOCUMENTED: ICD-10-PCS | Mod: CPTII,S$GLB,, | Performed by: STUDENT IN AN ORGANIZED HEALTH CARE EDUCATION/TRAINING PROGRAM

## 2023-03-29 PROCEDURE — 3078F DIAST BP <80 MM HG: CPT | Mod: CPTII,S$GLB,, | Performed by: STUDENT IN AN ORGANIZED HEALTH CARE EDUCATION/TRAINING PROGRAM

## 2023-03-29 PROCEDURE — 3044F HG A1C LEVEL LT 7.0%: CPT | Mod: CPTII,S$GLB,, | Performed by: STUDENT IN AN ORGANIZED HEALTH CARE EDUCATION/TRAINING PROGRAM

## 2023-03-29 PROCEDURE — 1159F MED LIST DOCD IN RCRD: CPT | Mod: CPTII,S$GLB,, | Performed by: STUDENT IN AN ORGANIZED HEALTH CARE EDUCATION/TRAINING PROGRAM

## 2023-03-29 PROCEDURE — 3008F PR BODY MASS INDEX (BMI) DOCUMENTED: ICD-10-PCS | Mod: CPTII,S$GLB,, | Performed by: STUDENT IN AN ORGANIZED HEALTH CARE EDUCATION/TRAINING PROGRAM

## 2023-03-29 PROCEDURE — 3074F SYST BP LT 130 MM HG: CPT | Mod: CPTII,S$GLB,, | Performed by: STUDENT IN AN ORGANIZED HEALTH CARE EDUCATION/TRAINING PROGRAM

## 2023-03-29 PROCEDURE — 99214 OFFICE O/P EST MOD 30 MIN: CPT | Mod: S$GLB,,, | Performed by: STUDENT IN AN ORGANIZED HEALTH CARE EDUCATION/TRAINING PROGRAM

## 2023-03-29 PROCEDURE — 3074F PR MOST RECENT SYSTOLIC BLOOD PRESSURE < 130 MM HG: ICD-10-PCS | Mod: CPTII,S$GLB,, | Performed by: STUDENT IN AN ORGANIZED HEALTH CARE EDUCATION/TRAINING PROGRAM

## 2023-03-29 PROCEDURE — 3078F PR MOST RECENT DIASTOLIC BLOOD PRESSURE < 80 MM HG: ICD-10-PCS | Mod: CPTII,S$GLB,, | Performed by: STUDENT IN AN ORGANIZED HEALTH CARE EDUCATION/TRAINING PROGRAM

## 2023-03-29 PROCEDURE — 90833 PR PSYCHOTHERAPY W/PATIENT W/E&M, 30 MIN (ADD ON): ICD-10-PCS | Mod: S$GLB,,, | Performed by: STUDENT IN AN ORGANIZED HEALTH CARE EDUCATION/TRAINING PROGRAM

## 2023-03-29 PROCEDURE — 1111F PR DISCHARGE MEDS RECONCILED W/ CURRENT OUTPATIENT MED LIST: ICD-10-PCS | Mod: CPTII,S$GLB,, | Performed by: STUDENT IN AN ORGANIZED HEALTH CARE EDUCATION/TRAINING PROGRAM

## 2023-03-29 PROCEDURE — 1160F RVW MEDS BY RX/DR IN RCRD: CPT | Mod: CPTII,S$GLB,, | Performed by: STUDENT IN AN ORGANIZED HEALTH CARE EDUCATION/TRAINING PROGRAM

## 2023-03-29 PROCEDURE — 3044F PR MOST RECENT HEMOGLOBIN A1C LEVEL <7.0%: ICD-10-PCS | Mod: CPTII,S$GLB,, | Performed by: STUDENT IN AN ORGANIZED HEALTH CARE EDUCATION/TRAINING PROGRAM

## 2023-03-29 PROCEDURE — 90833 PSYTX W PT W E/M 30 MIN: CPT | Mod: S$GLB,,, | Performed by: STUDENT IN AN ORGANIZED HEALTH CARE EDUCATION/TRAINING PROGRAM

## 2023-03-29 PROCEDURE — 1159F PR MEDICATION LIST DOCUMENTED IN MEDICAL RECORD: ICD-10-PCS | Mod: CPTII,S$GLB,, | Performed by: STUDENT IN AN ORGANIZED HEALTH CARE EDUCATION/TRAINING PROGRAM

## 2023-03-29 PROCEDURE — 99999 PR PBB SHADOW E&M-EST. PATIENT-LVL III: CPT | Mod: PBBFAC,,, | Performed by: STUDENT IN AN ORGANIZED HEALTH CARE EDUCATION/TRAINING PROGRAM

## 2023-03-29 PROCEDURE — 99999 PR PBB SHADOW E&M-EST. PATIENT-LVL III: ICD-10-PCS | Mod: PBBFAC,,, | Performed by: STUDENT IN AN ORGANIZED HEALTH CARE EDUCATION/TRAINING PROGRAM

## 2023-03-29 PROCEDURE — 99214 PR OFFICE/OUTPT VISIT, EST, LEVL IV, 30-39 MIN: ICD-10-PCS | Mod: S$GLB,,, | Performed by: STUDENT IN AN ORGANIZED HEALTH CARE EDUCATION/TRAINING PROGRAM

## 2023-03-29 PROCEDURE — 1111F DSCHRG MED/CURRENT MED MERGE: CPT | Mod: CPTII,S$GLB,, | Performed by: STUDENT IN AN ORGANIZED HEALTH CARE EDUCATION/TRAINING PROGRAM

## 2023-03-29 RX ORDER — ALPRAZOLAM 1 MG/1
1 TABLET ORAL 4 TIMES DAILY PRN
Qty: 120 TABLET | Refills: 2 | Status: SHIPPED | OUTPATIENT
Start: 2023-03-29 | End: 2023-05-03 | Stop reason: ALTCHOICE

## 2023-03-29 RX ORDER — PREGABALIN 300 MG/1
300 CAPSULE ORAL 2 TIMES DAILY
Qty: 60 CAPSULE | Refills: 2 | Status: SHIPPED | OUTPATIENT
Start: 2023-03-29 | End: 2023-05-03 | Stop reason: SDUPTHER

## 2023-03-29 NOTE — PROGRESS NOTES
"          03/29/2023  12:05 PM  Maury Smith  [id number]    Outpatient Psychiatry Follow-Up Visit (MD/NP)    3/29/2023    Clinical Status of Patient:  Outpatient (Ambulatory)      Chief Complaint:  Maury Smith is a 27 y.o. female who presents today for follow-up of depression and anxiety.  Met with patient.          Interval History and Content of Current Session:  Interim Events/Subjective Report/Content of Current Session:   MDD, recurrent, severe  JAIMEE  Panic attacks  PTSD  OCD (compulsions, buttons on phones)  Bulimia         Reports "my dad lowered by work hours so that's helping a little." Reports she continues to have panic attacks, "I feel helpless." She is having feelings of guilt due to "feeling useless at work, it makes me feel like a burden." Also had a panic attack after she had a fight with her BF. JAIMEE symptoms are high daily, "I worry about everything." She is meeting with her therapist weekly and also does phone sessions if needed.    She states her mood has been depressed, "low, not that happy.... every day."     PTSD symptoms are infrequent.    OCD compulsions are mental (pushing buttons) and occurring daily.     No vomiting.       Psychiatric Review Of Systems - Is patient experiencing or having changes in:  sleep: "pretty good"  appetite: "low"  energy/anergy: "very low"  interest/pleasure/anhedonia: variable  anxiety/panic: yes  Guilty/hopelessness/worthlessness: yes  concentration: yes  S.I.B.s/risky behavior: no  Irritability: yes  Substance abuse: no  Racing thoughts: no  Impulsive behaviors: no  Paranoia: no  AVH: no          Psychotherapy:  Target symptoms: depression, anxiety   Why chosen therapy is appropriate versus another modality: relevant to diagnosis  Outcome monitoring methods: self-report  Therapeutic intervention type: supportive psychotherapy  Topics discussed/themes: building skills sets for symptom management, symptom recognition,  The patient's response to the intervention " is accepting. The patient's progress toward treatment goals is fair.   Duration of intervention: 18 minutes.          Medical Review of Systems   Review of Systems   Constitutional:  Negative for chills and fever.   HENT:  Negative for hearing loss.    Eyes:  Negative for blurred vision and double vision.   Respiratory:  Negative for shortness of breath.    Cardiovascular:  Negative for chest pain and palpitations.   Gastrointestinal:  Negative for constipation, diarrhea, nausea and vomiting.   Genitourinary:  Negative for dysuria.   Musculoskeletal:  Negative for back pain and neck pain.   Skin:  Negative for rash.   Neurological:  Negative for dizziness and headaches.   Endo/Heme/Allergies:  Negative for environmental allergies.       Past Medical, Family and Social History: The patient's past medical, family and social history have been reviewed and updated as appropriate within the electronic medical record - see encounter notes.    Social History     Socioeconomic History    Marital status:     Number of children: 0   Tobacco Use    Smoking status: Never    Smokeless tobacco: Never   Substance and Sexual Activity    Alcohol use: Not Currently     Comment: she has cut back since her fall     Drug use: Never    Sexual activity: Yes     Partners: Male         Compliance: yes    Side effects: None    Risk Parameters:  Patient reports no suicidal ideation  Patient reports no homicidal ideation  Patient reports no self-injurious behavior  Patient reports no violent behavior      Risk factors were reviewed and brief risk assessment completed: estimate low imminent for suicide, long term risk moderate, see below        Exam (detailed: at least 9 elements; comprehensive: all 15 elements)     Vitals:    03/29/23 0953   BP: 106/71   Pulse: 94   Resp: 17         CONSTITUTIONAL  General Appearance: unremarkable, age appropriate    MUSCULOSKELETAL  Muscle Strength and Tone:no tremor, no tic  Abnormal Involuntary  Movements: No  Gait and Station: non-ataxic    PSYCHIATRIC   Level of Consciousness: awake and alert   Orientation: person, place and situation  Grooming: Casually dressed and Well groomed  Psychomotor Behavior: normal, cooperative  Speech: normal tone, normal rate, normal pitch, normal volume  Language: grossly intact  Mood: depressed  Affect: Consistent with mood  Thought Process: linear, logical  Associations: intact   Thought Content: DENIES suicidal ideation and DENIES homicidal ideation  Perceptions: denies hallucinations  Memory: Able to recall past events, Remote intact and Recent intact  Attention:Attends to interview without distraction  Fund of Knowledge: Aware of current events and Vocabulary appropriate   Estimate if Intelligence:  Average based on work/education history, vocabulary and mental status exam  Insight: has awareness of illness  Judgment: behavior is adequate to circumstances        Assessment and Diagnosis   Status/Progress: Based on the examination today, the patient's problem(s) is/are treatment resistant.  New problems have not been presented today.   Co-morbidities are complicating management of the primary condition.          Assessment/Impression:     MDD, recurrent, severe  JAIMEE  Panic attacks  PTSD  OCD  Bulimia            Plan:           MDD, recurrent, severe  - continue lamictal 400 mg PO qd  - continue cymbalta 120 mg PO qd  - continue psychotherapy with Solange Rollins weekly     JAIMEE  - continue trazodone 200  mg PO qhs  - increase lyrica 150 mg PO BID to 300 mg PO BID  - continue psychotherapy with Solangedanica Rollins weekly        OCD  - continue cymbalta 120 mg PO qd  - continue psychotherapy with Solange Rollins weekly  - pt counseled    Panic attacks  -  increase Xanax 1 mg PO TID to QID  - continue psychotherapy with Solange Rollins weekly  - pt counseled        PTSD  - continue cymbalta 120 mg PO qd  - continue psychotherapy with Solange Rollins weekly  - pt counseled         Bulimia Nervosa  - continue cymbalta 120 mg PO qd  - continue psychotherapy with Solange Rollins weekly  - pt counseled             - Instructed patient to keep all scheduled appointments, take medications as prescribed and abstain from substance abuse. Instructed to call 911 or present to ER for emergency including SI or HI.    - Discussed diagnosis, risks and benefits of proposed treatment above vs alternative treatments vs no treatment, and potential side effects of these treatments. Discussed the inherent unpredictability of treatment. The patient expresses understanding of the above and displays the capacity to agree with this treatment given said understanding. Patient also agrees that, currently, the benefits outweigh the risks and would like to pursue this treatment at this time.     - Any medications being used off-label were discussed with the patient inclusive of the evidence base for the use of the medications and consent was obtained for the off-label use of the medication.         Axel Villafana III, MD    Return to Clinic: 2 weeks

## 2023-04-12 ENCOUNTER — OFFICE VISIT (OUTPATIENT)
Dept: PSYCHIATRY | Facility: CLINIC | Age: 28
End: 2023-04-12
Payer: COMMERCIAL

## 2023-04-12 VITALS
RESPIRATION RATE: 17 BRPM | HEIGHT: 60 IN | OXYGEN SATURATION: 98 % | HEART RATE: 84 BPM | WEIGHT: 186.5 LBS | BODY MASS INDEX: 36.61 KG/M2 | DIASTOLIC BLOOD PRESSURE: 68 MMHG | SYSTOLIC BLOOD PRESSURE: 109 MMHG

## 2023-04-12 DIAGNOSIS — F33.2 SEVERE EPISODE OF RECURRENT MAJOR DEPRESSIVE DISORDER, WITHOUT PSYCHOTIC FEATURES: ICD-10-CM

## 2023-04-12 DIAGNOSIS — F41.0 PANIC ATTACKS: ICD-10-CM

## 2023-04-12 DIAGNOSIS — F41.1 GAD (GENERALIZED ANXIETY DISORDER): Primary | ICD-10-CM

## 2023-04-12 PROCEDURE — 1159F MED LIST DOCD IN RCRD: CPT | Mod: CPTII,S$GLB,, | Performed by: STUDENT IN AN ORGANIZED HEALTH CARE EDUCATION/TRAINING PROGRAM

## 2023-04-12 PROCEDURE — 90833 PSYTX W PT W E/M 30 MIN: CPT | Mod: S$GLB,,, | Performed by: STUDENT IN AN ORGANIZED HEALTH CARE EDUCATION/TRAINING PROGRAM

## 2023-04-12 PROCEDURE — 1160F PR REVIEW ALL MEDS BY PRESCRIBER/CLIN PHARMACIST DOCUMENTED: ICD-10-PCS | Mod: CPTII,S$GLB,, | Performed by: STUDENT IN AN ORGANIZED HEALTH CARE EDUCATION/TRAINING PROGRAM

## 2023-04-12 PROCEDURE — 1111F DSCHRG MED/CURRENT MED MERGE: CPT | Mod: CPTII,S$GLB,, | Performed by: STUDENT IN AN ORGANIZED HEALTH CARE EDUCATION/TRAINING PROGRAM

## 2023-04-12 PROCEDURE — 1160F RVW MEDS BY RX/DR IN RCRD: CPT | Mod: CPTII,S$GLB,, | Performed by: STUDENT IN AN ORGANIZED HEALTH CARE EDUCATION/TRAINING PROGRAM

## 2023-04-12 PROCEDURE — 3074F PR MOST RECENT SYSTOLIC BLOOD PRESSURE < 130 MM HG: ICD-10-PCS | Mod: CPTII,S$GLB,, | Performed by: STUDENT IN AN ORGANIZED HEALTH CARE EDUCATION/TRAINING PROGRAM

## 2023-04-12 PROCEDURE — 3008F BODY MASS INDEX DOCD: CPT | Mod: CPTII,S$GLB,, | Performed by: STUDENT IN AN ORGANIZED HEALTH CARE EDUCATION/TRAINING PROGRAM

## 2023-04-12 PROCEDURE — 3008F PR BODY MASS INDEX (BMI) DOCUMENTED: ICD-10-PCS | Mod: CPTII,S$GLB,, | Performed by: STUDENT IN AN ORGANIZED HEALTH CARE EDUCATION/TRAINING PROGRAM

## 2023-04-12 PROCEDURE — 99999 PR PBB SHADOW E&M-EST. PATIENT-LVL III: CPT | Mod: PBBFAC,,, | Performed by: STUDENT IN AN ORGANIZED HEALTH CARE EDUCATION/TRAINING PROGRAM

## 2023-04-12 PROCEDURE — 99214 OFFICE O/P EST MOD 30 MIN: CPT | Mod: S$GLB,,, | Performed by: STUDENT IN AN ORGANIZED HEALTH CARE EDUCATION/TRAINING PROGRAM

## 2023-04-12 PROCEDURE — 90833 PR PSYCHOTHERAPY W/PATIENT W/E&M, 30 MIN (ADD ON): ICD-10-PCS | Mod: S$GLB,,, | Performed by: STUDENT IN AN ORGANIZED HEALTH CARE EDUCATION/TRAINING PROGRAM

## 2023-04-12 PROCEDURE — 3044F PR MOST RECENT HEMOGLOBIN A1C LEVEL <7.0%: ICD-10-PCS | Mod: CPTII,S$GLB,, | Performed by: STUDENT IN AN ORGANIZED HEALTH CARE EDUCATION/TRAINING PROGRAM

## 2023-04-12 PROCEDURE — 1111F PR DISCHARGE MEDS RECONCILED W/ CURRENT OUTPATIENT MED LIST: ICD-10-PCS | Mod: CPTII,S$GLB,, | Performed by: STUDENT IN AN ORGANIZED HEALTH CARE EDUCATION/TRAINING PROGRAM

## 2023-04-12 PROCEDURE — 3044F HG A1C LEVEL LT 7.0%: CPT | Mod: CPTII,S$GLB,, | Performed by: STUDENT IN AN ORGANIZED HEALTH CARE EDUCATION/TRAINING PROGRAM

## 2023-04-12 PROCEDURE — 99999 PR PBB SHADOW E&M-EST. PATIENT-LVL III: ICD-10-PCS | Mod: PBBFAC,,, | Performed by: STUDENT IN AN ORGANIZED HEALTH CARE EDUCATION/TRAINING PROGRAM

## 2023-04-12 PROCEDURE — 3078F PR MOST RECENT DIASTOLIC BLOOD PRESSURE < 80 MM HG: ICD-10-PCS | Mod: CPTII,S$GLB,, | Performed by: STUDENT IN AN ORGANIZED HEALTH CARE EDUCATION/TRAINING PROGRAM

## 2023-04-12 PROCEDURE — 1159F PR MEDICATION LIST DOCUMENTED IN MEDICAL RECORD: ICD-10-PCS | Mod: CPTII,S$GLB,, | Performed by: STUDENT IN AN ORGANIZED HEALTH CARE EDUCATION/TRAINING PROGRAM

## 2023-04-12 PROCEDURE — 99214 PR OFFICE/OUTPT VISIT, EST, LEVL IV, 30-39 MIN: ICD-10-PCS | Mod: S$GLB,,, | Performed by: STUDENT IN AN ORGANIZED HEALTH CARE EDUCATION/TRAINING PROGRAM

## 2023-04-12 PROCEDURE — 3078F DIAST BP <80 MM HG: CPT | Mod: CPTII,S$GLB,, | Performed by: STUDENT IN AN ORGANIZED HEALTH CARE EDUCATION/TRAINING PROGRAM

## 2023-04-12 PROCEDURE — 3074F SYST BP LT 130 MM HG: CPT | Mod: CPTII,S$GLB,, | Performed by: STUDENT IN AN ORGANIZED HEALTH CARE EDUCATION/TRAINING PROGRAM

## 2023-04-12 RX ORDER — LAMOTRIGINE 200 MG/1
400 TABLET ORAL DAILY
Qty: 60 TABLET | Refills: 2 | Status: SHIPPED | OUTPATIENT
Start: 2023-04-12 | End: 2023-05-03 | Stop reason: SDUPTHER

## 2023-04-12 RX ORDER — DULOXETIN HYDROCHLORIDE 60 MG/1
120 CAPSULE, DELAYED RELEASE ORAL DAILY
Qty: 60 CAPSULE | Refills: 2 | Status: SHIPPED | OUTPATIENT
Start: 2023-04-12 | End: 2023-05-03

## 2023-04-12 NOTE — PROGRESS NOTES
"            04/12/2023  12:05 PM  Maury Smith  [id number]    Outpatient Psychiatry Follow-Up Visit (MD/NP)    4/12/2023    Clinical Status of Patient:  Outpatient (Ambulatory)      Chief Complaint:  Maury Smith is a 27 y.o. female who presents today for follow-up of depression and anxiety.  Met with patient.          Interval History and Content of Current Session:  Interim Events/Subjective Report/Content of Current Session:   MDD, recurrent, severe  JAIMEE  Panic attacks  PTSD  OCD (compulsions, buttons on phones)  Bulimia           "I feel like my family is too hard on me, my sister keeps getting on to me, it's really upsetting, I feel like I can't do anything right.... she makes me feel bad about it... I was raising my voice on the phone." She reports she bought a new house, "I was so happy at first, but I feel like I can't be happy about it anymore, my drive to work is 20 minutes instead of an hour, but my family is doing the complete opposite, not supporting me, especially at work, my dad gets pissed off, I'm damned if I do, damned if I don't... my dad was yelling at me, it's really sucky, really negative, I don't like being in that environment." Mood has been depressed daily. JAIMEE symptoms are high daily. 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    Reports 1-2 panic attacks daily, "quite bad, I have to go in the back run at work, lots of crying and I can't breath, I shake," triggered by dad yelling at her.     No purging.         Psychiatric Review Of Systems - Is patient experiencing or having changes in:  sleep: "pretty decently"  appetite: "protein shakes, my appetite is quite low"  energy/anergy: no  interest/pleasure/anhedonia: enjoying going for walks and reading  anxiety/panic: yes  Guilty/hopelessness/worthlessness: yes  concentration: yes  S.I.B.s/risky behavior: no  Irritability: yes  Substance abuse: no  Racing thoughts: no  Impulsive behaviors: " no  Paranoia: no  AVH: no  PTSD: yes        Psychotherapy:  Target symptoms: depression, anxiety   Why chosen therapy is appropriate versus another modality: relevant to diagnosis  Outcome monitoring methods: self-report  Therapeutic intervention type: supportive psychotherapy  Topics discussed/themes: relationships difficulties, work stress, building skills sets for symptom management, symptom recognition,  The patient's response to the intervention is accepting. The patient's progress toward treatment goals is fair.   Duration of intervention: 20 minutes.          Medical Review of Systems   Review of Systems   Constitutional:  Negative for chills and fever.   HENT:  Negative for hearing loss.    Eyes:  Negative for blurred vision and double vision.   Respiratory:  Negative for shortness of breath.    Cardiovascular:  Negative for chest pain and palpitations.   Gastrointestinal:  Negative for constipation, diarrhea, nausea and vomiting.   Genitourinary:  Negative for dysuria.   Musculoskeletal:  Negative for back pain and neck pain.   Skin:  Negative for rash.   Neurological:  Negative for dizziness and headaches.   Endo/Heme/Allergies:  Negative for environmental allergies.       Past Medical, Family and Social History: The patient's past medical, family and social history have been reviewed and updated as appropriate within the electronic medical record - see encounter notes.    Social History     Socioeconomic History    Marital status:     Number of children: 0   Tobacco Use    Smoking status: Never    Smokeless tobacco: Never   Substance and Sexual Activity    Alcohol use: Not Currently     Comment: she has cut back since her fall     Drug use: Never    Sexual activity: Yes     Partners: Male         Compliance: yes    Side effects: None    Risk Parameters:  Patient reports no suicidal ideation  Patient reports no homicidal ideation  Patient reports no self-injurious behavior  Patient reports no  violent behavior      Risk factors were reviewed and brief risk assessment completed: estimate low imminent for suicide, long term risk moderate, see below      Exam (detailed: at least 9 elements; comprehensive: all 15 elements)     Vitals:    04/12/23 0857   BP: 109/68   Pulse: 84   Resp: 17         CONSTITUTIONAL  General Appearance: unremarkable, age appropriate    MUSCULOSKELETAL  Muscle Strength and Tone:no tremor, no tic  Abnormal Involuntary Movements: No  Gait and Station: non-ataxic    PSYCHIATRIC   Level of Consciousness: awake and alert   Orientation: person, place and situation  Grooming: Casually dressed and Well groomed  Psychomotor Behavior: normal, cooperative  Speech: normal tone, normal rate, normal pitch, normal volume  Language: grossly intact  Mood: depressed  Affect: Consistent with mood  Thought Process: linear, logical  Associations: intact   Thought Content: DENIES suicidal ideation and DENIES homicidal ideation  Perceptions: denies hallucinations  Memory: Able to recall past events, Remote intact and Recent intact  Attention:Attends to interview without distraction  Fund of Knowledge: Aware of current events and Vocabulary appropriate   Estimate if Intelligence:  Average based on work/education history, vocabulary and mental status exam  Insight: has awareness of illness  Judgment: behavior is adequate to circumstances        Assessment and Diagnosis   Status/Progress: Based on the examination today, the patient's problem(s) is/are treatment resistant.  New problems have not been presented today.   Co-morbidities are complicating management of the primary condition.          Assessment/Impression:       MDD, recurrent, severe  JAIMEE  Panic attacks  PTSD  OCD  Bulimia            Plan:           MDD, recurrent, severe  - continue lamictal 400 mg PO qd  - continue cymbalta 120 mg PO qd  - continue psychotherapy with Solange sweeney     JAIMEE  - continue trazodone 200  mg PO qhs  - continue  lyrica 300 mg PO BID  - continue psychotherapy with Solange Ayo weekly        OCD  - continue cymbalta 120 mg PO qd  - continue psychotherapy with Solange Ayo weekly  - pt counseled      Panic attacks  -  continue Xanax 1 mg PO QID  - continue psychotherapy with Solange Ayo weekly  - pt counseled        PTSD  - continue cymbalta 120 mg PO qd  - continue psychotherapy with Solange Ayo weekly  - pt counseled        Bulimia Nervosa  - continue cymbalta 120 mg PO qd  - continue psychotherapy with Solange Ayo weekly  - pt counseled             - Instructed patient to keep all scheduled appointments, take medications as prescribed and abstain from substance abuse. Instructed to call 911 or present to ER for emergency including SI or HI.    - Discussed diagnosis, risks and benefits of proposed treatment above vs alternative treatments vs no treatment, and potential side effects of these treatments. Discussed the inherent unpredictability of treatment. The patient expresses understanding of the above and displays the capacity to agree with this treatment given said understanding. Patient also agrees that, currently, the benefits outweigh the risks and would like to pursue this treatment at this time.     - Any medications being used off-label were discussed with the patient inclusive of the evidence base for the use of the medications and consent was obtained for the off-label use of the medication.         Axel Villafana III, MD    Return to Clinic: 4 weeks

## 2023-04-26 ENCOUNTER — TELEPHONE (OUTPATIENT)
Dept: PSYCHIATRY | Facility: CLINIC | Age: 28
End: 2023-04-26
Payer: COMMERCIAL

## 2023-04-26 NOTE — TELEPHONE ENCOUNTER
----- Message from Saima Pina sent at 2023  2:57 PM CDT -----  Contact: PATIENT  Maury Smith  MRN: [id number]  : 1995  PCP: Nicolas Kenny  Home Phone      [phone]  Work Phone      Not on file.  Mobile          [phone]      MESSAGE: Patient states that earlier today she was seen in the ER for shaking of the arms & legs and due to the medication that she is prescribed by Dr. Villafana they are telling her to touch base with him to make sure that the medication they prescribed for her is ok with him.        Phone: [phone]

## 2023-04-26 NOTE — TELEPHONE ENCOUNTER
Patient went to the ED because of some twitching and she would like you to review the visit notes and make sure the 3 injections they gave her is okay? Please advise.

## 2023-04-27 NOTE — TELEPHONE ENCOUNTER
Informed patient and told her we would discuss anything further at upcoming appt on 5/17. She voiced understanding.

## 2023-05-01 ENCOUNTER — PATIENT MESSAGE (OUTPATIENT)
Dept: PSYCHIATRY | Facility: CLINIC | Age: 28
End: 2023-05-01
Payer: COMMERCIAL

## 2023-05-03 ENCOUNTER — OFFICE VISIT (OUTPATIENT)
Dept: PSYCHIATRY | Facility: CLINIC | Age: 28
End: 2023-05-03
Payer: COMMERCIAL

## 2023-05-03 ENCOUNTER — PATIENT MESSAGE (OUTPATIENT)
Dept: PSYCHIATRY | Facility: CLINIC | Age: 28
End: 2023-05-03
Payer: COMMERCIAL

## 2023-05-03 VITALS
RESPIRATION RATE: 17 BRPM | SYSTOLIC BLOOD PRESSURE: 108 MMHG | HEIGHT: 60 IN | WEIGHT: 183.88 LBS | OXYGEN SATURATION: 97 % | DIASTOLIC BLOOD PRESSURE: 68 MMHG | BODY MASS INDEX: 36.1 KG/M2 | HEART RATE: 91 BPM

## 2023-05-03 DIAGNOSIS — F33.2 SEVERE EPISODE OF RECURRENT MAJOR DEPRESSIVE DISORDER, WITHOUT PSYCHOTIC FEATURES: ICD-10-CM

## 2023-05-03 DIAGNOSIS — R45.851 DEPRESSION WITH SUICIDAL IDEATION: ICD-10-CM

## 2023-05-03 DIAGNOSIS — G47.00 INSOMNIA, UNSPECIFIED TYPE: ICD-10-CM

## 2023-05-03 DIAGNOSIS — F41.1 GAD (GENERALIZED ANXIETY DISORDER): Primary | ICD-10-CM

## 2023-05-03 DIAGNOSIS — F32.A DEPRESSION WITH SUICIDAL IDEATION: ICD-10-CM

## 2023-05-03 PROCEDURE — 1159F PR MEDICATION LIST DOCUMENTED IN MEDICAL RECORD: ICD-10-PCS | Mod: CPTII,S$GLB,, | Performed by: STUDENT IN AN ORGANIZED HEALTH CARE EDUCATION/TRAINING PROGRAM

## 2023-05-03 PROCEDURE — 3078F PR MOST RECENT DIASTOLIC BLOOD PRESSURE < 80 MM HG: ICD-10-PCS | Mod: CPTII,S$GLB,, | Performed by: STUDENT IN AN ORGANIZED HEALTH CARE EDUCATION/TRAINING PROGRAM

## 2023-05-03 PROCEDURE — 3044F HG A1C LEVEL LT 7.0%: CPT | Mod: CPTII,S$GLB,, | Performed by: STUDENT IN AN ORGANIZED HEALTH CARE EDUCATION/TRAINING PROGRAM

## 2023-05-03 PROCEDURE — 99214 OFFICE O/P EST MOD 30 MIN: CPT | Mod: S$GLB,,, | Performed by: STUDENT IN AN ORGANIZED HEALTH CARE EDUCATION/TRAINING PROGRAM

## 2023-05-03 PROCEDURE — 3008F PR BODY MASS INDEX (BMI) DOCUMENTED: ICD-10-PCS | Mod: CPTII,S$GLB,, | Performed by: STUDENT IN AN ORGANIZED HEALTH CARE EDUCATION/TRAINING PROGRAM

## 2023-05-03 PROCEDURE — 90833 PR PSYCHOTHERAPY W/PATIENT W/E&M, 30 MIN (ADD ON): ICD-10-PCS | Mod: S$GLB,,, | Performed by: STUDENT IN AN ORGANIZED HEALTH CARE EDUCATION/TRAINING PROGRAM

## 2023-05-03 PROCEDURE — 99214 PR OFFICE/OUTPT VISIT, EST, LEVL IV, 30-39 MIN: ICD-10-PCS | Mod: S$GLB,,, | Performed by: STUDENT IN AN ORGANIZED HEALTH CARE EDUCATION/TRAINING PROGRAM

## 2023-05-03 PROCEDURE — 1160F RVW MEDS BY RX/DR IN RCRD: CPT | Mod: CPTII,S$GLB,, | Performed by: STUDENT IN AN ORGANIZED HEALTH CARE EDUCATION/TRAINING PROGRAM

## 2023-05-03 PROCEDURE — 3044F PR MOST RECENT HEMOGLOBIN A1C LEVEL <7.0%: ICD-10-PCS | Mod: CPTII,S$GLB,, | Performed by: STUDENT IN AN ORGANIZED HEALTH CARE EDUCATION/TRAINING PROGRAM

## 2023-05-03 PROCEDURE — 1160F PR REVIEW ALL MEDS BY PRESCRIBER/CLIN PHARMACIST DOCUMENTED: ICD-10-PCS | Mod: CPTII,S$GLB,, | Performed by: STUDENT IN AN ORGANIZED HEALTH CARE EDUCATION/TRAINING PROGRAM

## 2023-05-03 PROCEDURE — 1159F MED LIST DOCD IN RCRD: CPT | Mod: CPTII,S$GLB,, | Performed by: STUDENT IN AN ORGANIZED HEALTH CARE EDUCATION/TRAINING PROGRAM

## 2023-05-03 PROCEDURE — 99999 PR PBB SHADOW E&M-EST. PATIENT-LVL III: ICD-10-PCS | Mod: PBBFAC,,, | Performed by: STUDENT IN AN ORGANIZED HEALTH CARE EDUCATION/TRAINING PROGRAM

## 2023-05-03 PROCEDURE — 3078F DIAST BP <80 MM HG: CPT | Mod: CPTII,S$GLB,, | Performed by: STUDENT IN AN ORGANIZED HEALTH CARE EDUCATION/TRAINING PROGRAM

## 2023-05-03 PROCEDURE — 3008F BODY MASS INDEX DOCD: CPT | Mod: CPTII,S$GLB,, | Performed by: STUDENT IN AN ORGANIZED HEALTH CARE EDUCATION/TRAINING PROGRAM

## 2023-05-03 PROCEDURE — 99999 PR PBB SHADOW E&M-EST. PATIENT-LVL III: CPT | Mod: PBBFAC,,, | Performed by: STUDENT IN AN ORGANIZED HEALTH CARE EDUCATION/TRAINING PROGRAM

## 2023-05-03 PROCEDURE — 90833 PSYTX W PT W E/M 30 MIN: CPT | Mod: S$GLB,,, | Performed by: STUDENT IN AN ORGANIZED HEALTH CARE EDUCATION/TRAINING PROGRAM

## 2023-05-03 PROCEDURE — 3074F PR MOST RECENT SYSTOLIC BLOOD PRESSURE < 130 MM HG: ICD-10-PCS | Mod: CPTII,S$GLB,, | Performed by: STUDENT IN AN ORGANIZED HEALTH CARE EDUCATION/TRAINING PROGRAM

## 2023-05-03 PROCEDURE — 3074F SYST BP LT 130 MM HG: CPT | Mod: CPTII,S$GLB,, | Performed by: STUDENT IN AN ORGANIZED HEALTH CARE EDUCATION/TRAINING PROGRAM

## 2023-05-03 RX ORDER — DULOXETIN HYDROCHLORIDE 30 MG/1
90 CAPSULE, DELAYED RELEASE ORAL DAILY
Qty: 90 CAPSULE | Refills: 2 | Status: SHIPPED | OUTPATIENT
Start: 2023-05-03 | End: 2023-05-15 | Stop reason: ALTCHOICE

## 2023-05-03 RX ORDER — LAMOTRIGINE 200 MG/1
400 TABLET ORAL DAILY
Qty: 60 TABLET | Refills: 2 | Status: SHIPPED | OUTPATIENT
Start: 2023-05-03 | End: 2024-03-18 | Stop reason: SDUPTHER

## 2023-05-03 RX ORDER — TRAZODONE HYDROCHLORIDE 100 MG/1
TABLET ORAL
Qty: 60 TABLET | Refills: 2 | Status: SHIPPED | OUTPATIENT
Start: 2023-05-03 | End: 2023-07-17

## 2023-05-03 RX ORDER — CLONAZEPAM 2 MG/1
2 TABLET ORAL 3 TIMES DAILY
Qty: 90 TABLET | Refills: 2 | Status: SHIPPED | OUTPATIENT
Start: 2023-05-03 | End: 2023-05-15

## 2023-05-03 RX ORDER — PREGABALIN 300 MG/1
300 CAPSULE ORAL 2 TIMES DAILY
Qty: 60 CAPSULE | Refills: 2 | Status: SHIPPED | OUTPATIENT
Start: 2023-05-03 | End: 2023-05-15 | Stop reason: ALTCHOICE

## 2023-05-03 NOTE — PROGRESS NOTES
"            05/03/2023  12:05 PM  Maury Smith  [id number]    Outpatient Psychiatry Follow-Up Visit (MD/NP)    5/3/2023    Clinical Status of Patient:  Outpatient (Ambulatory)      Chief Complaint:  Maury Smith is a 27 y.o. female who presents today for follow-up of depression and anxiety.  Met with patient.          Interval History and Content of Current Session:  Interim Events/Subjective Report/Content of Current Session:   MDD, recurrent, severe  JAIMEE  Panic attacks  PTSD  OCD (compulsions, buttons on phones)  Bulimia         Reports she states she wakes up at 3 am every morning and cannot go back to sleep. She states she goes to bed at 10 or 11 pm at night. She reports her mood has been "pretty decent," does not feel she is depressed currently, depressed mood "not often." JAIMEE symptoms daily revolve around moving into a new house, "it frustrates me," and work is stressful, "my dad is in a weird mood and takes it out on me, that stresses me out." She states she is having panic attacks daily, "but not big enough to go to the hospital."     OCD symptoms are less.    She is doing yoga and walking.        Psychiatric Review Of Systems - Is patient experiencing or having changes in:  Sleep: yes  appetite: "a lot healthier"   energy/anergy: variable  interest/pleasure/anhedonia: variable  anxiety/panic: yes  Guilty/hopelessness/worthlessness: yes  concentration: yes  S.I.B.s/risky behavior: no  Irritability: "not as often, maybe two or three times per week"  Substance abuse: no  Racing thoughts: no  Impulsive behaviors: no  Paranoia: no  AVH: no  PTSD: yes, nightmares        Psychotherapy:  Target symptoms: depression, anxiety   Why chosen therapy is appropriate versus another modality: relevant to diagnosis  Outcome monitoring methods: self-report  Therapeutic intervention type: supportive psychotherapy  Topics discussed/themes: work stress, building skills sets for symptom management,  The patient's response to " the intervention is accepting. The patient's progress toward treatment goals is fair.   Duration of intervention: 19 minutes.          Medical Review of Systems   Review of Systems   Constitutional:  Negative for chills and fever.   HENT:  Negative for hearing loss.    Eyes:  Negative for blurred vision and double vision.   Respiratory:  Negative for shortness of breath.    Cardiovascular:  Negative for chest pain and palpitations.   Gastrointestinal:  Negative for constipation, diarrhea, nausea and vomiting.   Genitourinary:  Negative for dysuria.   Musculoskeletal:  Negative for back pain and neck pain.   Skin:  Negative for rash.   Neurological:  Negative for dizziness and headaches.   Endo/Heme/Allergies:  Negative for environmental allergies.       Past Medical, Family and Social History: The patient's past medical, family and social history have been reviewed and updated as appropriate within the electronic medical record - see encounter notes.    Social History     Socioeconomic History    Marital status:     Number of children: 0   Tobacco Use    Smoking status: Never    Smokeless tobacco: Never   Substance and Sexual Activity    Alcohol use: Not Currently     Comment: she has cut back since her fall     Drug use: Never    Sexual activity: Yes     Partners: Male         Compliance: yes    Side effects: None    Risk Parameters:  Patient reports no suicidal ideation  Patient reports no homicidal ideation  Patient reports no self-injurious behavior  Patient reports no violent behavior        Exam (detailed: at least 9 elements; comprehensive: all 15 elements)     Vitals:    05/03/23 0857   BP: 108/68   Pulse: 91   Resp: 17       Wt Readings from Last 4 Encounters:   05/03/23 83.4 kg (183 lb 13.8 oz)   04/12/23 84.6 kg (186 lb 8.2 oz)   03/29/23 81.4 kg (179 lb 7.3 oz)   03/17/23 81.2 kg (179 lb 1.6 oz)         CONSTITUTIONAL  General Appearance: unremarkable, age appropriate    MUSCULOSKELETAL  Muscle  Strength and Tone:no tremor, no tic  Abnormal Involuntary Movements: No  Gait and Station: non-ataxic    PSYCHIATRIC   Level of Consciousness: awake and alert   Orientation: person, place and situation  Grooming: Casually dressed and Well groomed  Psychomotor Behavior: normal, cooperative  Speech: normal tone, normal rate, normal pitch, normal volume  Language: grossly intact  Mood: anxious  Affect: Consistent with mood  Thought Process: linear, logical  Associations: intact   Thought Content: DENIES suicidal ideation and DENIES homicidal ideation  Perceptions: denies hallucinations  Memory: Able to recall past events, Remote intact and Recent intact  Attention:Attends to interview without distraction  Fund of Knowledge: Aware of current events and Vocabulary appropriate   Estimate if Intelligence:  Average based on work/education history, vocabulary and mental status exam  Insight: has awareness of illness  Judgment: behavior is adequate to circumstances        Assessment and Diagnosis   Status/Progress: Based on the examination today, the patient's problem(s) is/are treatment resistant.  New problems have not been presented today.   Co-morbidities are complicating management of the primary condition.          Assessment/Impression:       MDD, recurrent, severe  JAIMEE  Panic attacks  PTSD  OCD  Bulimia            Plan:           MDD, recurrent, severe  - continue lamictal 400 mg PO qd  - decrease cymbalta 120 to 90 mg PO qd  - continue psychotherapy with Solange Rollins weekly     JAIMEE  - continue trazodone 100-200  mg PO qhs  - continue lyrica 300 mg PO BID  - continue psychotherapy with Solange Rollins weekly        OCD  - decrease cymbalta 120 to 90 mg PO qd  - continue psychotherapy with Solange Rollins weekly  - pt counseled      Panic attacks  -  stop xanax  - start klonopin 2 mg PO TID  - continue psychotherapy with Solange Rollins weekly  - pt counseled        PTSD  - decrease cymbalta 120 to 90 mg PO qd  -  continue psychotherapy with Solange Rollins weekly  - pt counseled        Bulimia Nervosa  - decrease cymbalta 120 to 90 mg PO qd  - continue psychotherapy with Solange Rollins weekly  - pt counseled             - Instructed patient to keep all scheduled appointments, take medications as prescribed and abstain from substance abuse. Instructed to call 911 or present to ER for emergency including SI or HI.    - Discussed diagnosis, risks and benefits of proposed treatment above vs alternative treatments vs no treatment, and potential side effects of these treatments. Discussed the inherent unpredictability of treatment. The patient expresses understanding of the above and displays the capacity to agree with this treatment given said understanding. Patient also agrees that, currently, the benefits outweigh the risks and would like to pursue this treatment at this time.     - Any medications being used off-label were discussed with the patient inclusive of the evidence base for the use of the medications and consent was obtained for the off-label use of the medication.         Axel Villafana III, MD    Return to Clinic: 4 weeks

## 2023-05-09 ENCOUNTER — TELEPHONE (OUTPATIENT)
Dept: PSYCHIATRY | Facility: CLINIC | Age: 28
End: 2023-05-09
Payer: COMMERCIAL
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
## 2023-05-15 ENCOUNTER — OFFICE VISIT (OUTPATIENT)
Dept: PSYCHIATRY | Facility: CLINIC | Age: 28
End: 2023-05-15
Payer: COMMERCIAL

## 2023-05-15 DIAGNOSIS — F41.0 PANIC ATTACKS: ICD-10-CM

## 2023-05-15 DIAGNOSIS — F41.1 GAD (GENERALIZED ANXIETY DISORDER): Primary | ICD-10-CM

## 2023-05-15 DIAGNOSIS — F33.2 SEVERE EPISODE OF RECURRENT MAJOR DEPRESSIVE DISORDER, WITHOUT PSYCHOTIC FEATURES: ICD-10-CM

## 2023-05-15 PROCEDURE — 3044F HG A1C LEVEL LT 7.0%: CPT | Mod: CPTII,95,, | Performed by: STUDENT IN AN ORGANIZED HEALTH CARE EDUCATION/TRAINING PROGRAM

## 2023-05-15 PROCEDURE — 99214 PR OFFICE/OUTPT VISIT, EST, LEVL IV, 30-39 MIN: ICD-10-PCS | Mod: 95,,, | Performed by: STUDENT IN AN ORGANIZED HEALTH CARE EDUCATION/TRAINING PROGRAM

## 2023-05-15 PROCEDURE — 3044F PR MOST RECENT HEMOGLOBIN A1C LEVEL <7.0%: ICD-10-PCS | Mod: CPTII,95,, | Performed by: STUDENT IN AN ORGANIZED HEALTH CARE EDUCATION/TRAINING PROGRAM

## 2023-05-15 PROCEDURE — 1160F RVW MEDS BY RX/DR IN RCRD: CPT | Mod: CPTII,95,, | Performed by: STUDENT IN AN ORGANIZED HEALTH CARE EDUCATION/TRAINING PROGRAM

## 2023-05-15 PROCEDURE — 1160F PR REVIEW ALL MEDS BY PRESCRIBER/CLIN PHARMACIST DOCUMENTED: ICD-10-PCS | Mod: CPTII,95,, | Performed by: STUDENT IN AN ORGANIZED HEALTH CARE EDUCATION/TRAINING PROGRAM

## 2023-05-15 PROCEDURE — 99214 OFFICE O/P EST MOD 30 MIN: CPT | Mod: 95,,, | Performed by: STUDENT IN AN ORGANIZED HEALTH CARE EDUCATION/TRAINING PROGRAM

## 2023-05-15 PROCEDURE — 1159F PR MEDICATION LIST DOCUMENTED IN MEDICAL RECORD: ICD-10-PCS | Mod: CPTII,95,, | Performed by: STUDENT IN AN ORGANIZED HEALTH CARE EDUCATION/TRAINING PROGRAM

## 2023-05-15 PROCEDURE — 1159F MED LIST DOCD IN RCRD: CPT | Mod: CPTII,95,, | Performed by: STUDENT IN AN ORGANIZED HEALTH CARE EDUCATION/TRAINING PROGRAM

## 2023-05-15 RX ORDER — FLUOXETINE HYDROCHLORIDE 20 MG/1
20 CAPSULE ORAL DAILY
Qty: 30 CAPSULE | Refills: 2 | Status: SHIPPED | OUTPATIENT
Start: 2023-05-15 | End: 2024-03-18 | Stop reason: SDUPTHER

## 2023-05-15 RX ORDER — CLONAZEPAM 2 MG/1
2 TABLET ORAL NIGHTLY
Qty: 30 TABLET | Refills: 0 | Status: SHIPPED | OUTPATIENT
Start: 2023-07-26 | End: 2024-03-18 | Stop reason: SDUPTHER
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
## 2023-05-19 ENCOUNTER — TELEPHONE (OUTPATIENT)
Dept: PSYCHIATRY | Facility: CLINIC | Age: 28
End: 2023-05-19
Payer: COMMERCIAL

## 2023-05-19 NOTE — TELEPHONE ENCOUNTER
----- Message from Caroline Payne sent at 2023  2:37 PM CDT -----  Contact: self  Maury Smith  MRN: [id number]  : 1995  PCP: Nicolas Kenny  Home Phone      [phone]  Work Phone      Not on file.  Mobile          [phone]      MESSAGE: calling stating they want refill her FLUoxetine due to other mediation interaction        Phone [phone]

## 2023-05-19 NOTE — TELEPHONE ENCOUNTER
Attempted to contact patient for clarification. No answer, left a voicemail to return call to clinic

## 2023-05-22 NOTE — TELEPHONE ENCOUNTER
Unable to reach patient. Contacted pharmacy and they stated no issues, patient already picked up the medication.

## 2023-05-24 ENCOUNTER — TELEPHONE (OUTPATIENT)
Dept: PSYCHIATRY | Facility: CLINIC | Age: 28
End: 2023-05-24
Payer: COMMERCIAL

## 2023-05-24 ENCOUNTER — PATIENT MESSAGE (OUTPATIENT)
Dept: PSYCHIATRY | Facility: CLINIC | Age: 28
End: 2023-05-24
Payer: COMMERCIAL

## 2023-05-24 NOTE — TELEPHONE ENCOUNTER
Contacted pharmacy about Fluoxetine question    Pharmacist states that she is taking ibuprofen and that with the Fluoxetine  a drug interaction is possible.     Informed pharmacist that a message will be put to the provider to see if he would like to go forward with the medication or change to something else.     V/u

## 2023-05-24 NOTE — TELEPHONE ENCOUNTER
----- Message from Caroline Payne sent at 2023 12:08 PM CDT -----  Contact: self  Maury Smith  MRN: [id number]  : 1995  PCP: Nicolas Kenny  Home Phone      [phone]  Work Phone      Not on file.  Mobile          [phone]      MESSAGE: Have question about medication FLUoxetine please return her call      Work #  [phone]

## 2023-05-25 ENCOUNTER — TELEPHONE (OUTPATIENT)
Dept: PSYCHIATRY | Facility: CLINIC | Age: 28
End: 2023-05-25
Payer: COMMERCIAL

## 2023-05-25 NOTE — TELEPHONE ENCOUNTER
----- Message from Caroline Payne sent at 2023 12:08 PM CDT -----  Contact: Peyton Smith  MRN: [id number]  : 1995  PCP: Nicolas Kenny  Home Phone      [phone]  Work Phone      Not on file.  Mobile          [phone]      MESSAGE: calling about a mediation interaction      Phone [phone]

## 2023-05-25 NOTE — TELEPHONE ENCOUNTER
Contacted the pharmacy to approve the drug interaction over ride.   Contacted aMury to inform her. She v/u.

## 2023-05-25 NOTE — TELEPHONE ENCOUNTER
Contacted the pharmacy for the 2nd time and approve the drug interaction.    Notified Dr Salcedo that patient has had 6 loose stools since arriving on floor, patient states this is fairly common due to her IBS. Requested imodium.

## 2023-05-31 ENCOUNTER — OFFICE VISIT (OUTPATIENT)
Dept: URGENT CARE | Facility: CLINIC | Age: 28
End: 2023-05-31
Payer: COMMERCIAL

## 2023-05-31 VITALS
SYSTOLIC BLOOD PRESSURE: 132 MMHG | DIASTOLIC BLOOD PRESSURE: 83 MMHG | BODY MASS INDEX: 35.88 KG/M2 | OXYGEN SATURATION: 95 % | WEIGHT: 182.75 LBS | RESPIRATION RATE: 19 BRPM | TEMPERATURE: 98 F | HEART RATE: 94 BPM | HEIGHT: 60 IN

## 2023-05-31 DIAGNOSIS — M25.531 WRIST PAIN, ACUTE, RIGHT: Primary | ICD-10-CM

## 2023-05-31 PROCEDURE — 99213 PR OFFICE/OUTPT VISIT, EST, LEVL III, 20-29 MIN: ICD-10-PCS | Mod: S$GLB,,, | Performed by: NURSE PRACTITIONER

## 2023-05-31 PROCEDURE — 73110 X-RAY EXAM OF WRIST: CPT | Mod: RT,S$GLB,, | Performed by: RADIOLOGY

## 2023-05-31 PROCEDURE — 73110 XR WRIST COMPLETE 3 VIEWS RIGHT: ICD-10-PCS | Mod: RT,S$GLB,, | Performed by: RADIOLOGY

## 2023-05-31 PROCEDURE — 99213 OFFICE O/P EST LOW 20 MIN: CPT | Mod: S$GLB,,, | Performed by: NURSE PRACTITIONER

## 2023-05-31 RX ORDER — IMIPRAMINE HYDROCHLORIDE 10 MG/1
10 TABLET, FILM COATED ORAL NIGHTLY
COMMUNITY
Start: 2023-04-22
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
## 2023-05-31 NOTE — PATIENT INSTRUCTIONS
-Take all medications as directed.    -Rest, elevate your affected extremity above the level of the heart, and apply ice for 20 minutes at a time 3-5 times daily over the next several days.   -Wear splint/sling as directed.  -Follow up with the orthopedist/physical or Occupational therapy as directed at this visit.     If your condition worsens at any time, you should report immediately to your nearest Emergency Department for further evaluation. **You must understand that you have received Urgent Care treatment only and that you may be released before all of your medical problems are known or treated. You, the patient, are responsible to arrange for follow-up care as instructed.

## 2023-06-23 ENCOUNTER — TELEPHONE (OUTPATIENT)
Dept: FAMILY MEDICINE | Facility: CLINIC | Age: 28
End: 2023-06-23
Payer: COMMERCIAL

## 2023-07-17 RX ORDER — TRAZODONE HYDROCHLORIDE 100 MG/1
TABLET ORAL
Qty: 45 TABLET | Refills: 0 | Status: SHIPPED | OUTPATIENT
Start: 2023-07-17

## 2024-02-09 NOTE — CONSULTS
"[facility]  Psychiatry  Telepsychiatry Consult Note    Please see previous notes:    Patient agreeable to consultation via telepsychiatry.    Tele-Consultation from Psychiatry started: 5/23/2022 at 8:41 PM  The chief complaint leading to psychiatric consultation is: SI  This consultation was requested by Dr Hernandez, the Emergency Department attending physician.  The location of the consulting psychiatrist is [place].  The patient location is  [facility] EMERGENCY DEPARTMENT   The patient arrived at the ED at: 1904    Also present with the patient at the time of the consultation: none    Patient Identification:   Maury Smith is a 26 y.o. female.    Patient information was obtained from patient, parent and past medical records.  Patient presented voluntarily to the Emergency Department by private vehicle.    Inpatient consult to Telemedicine - Psychiatry  Consult performed by: Skye Moy MD  Consult ordered by: Mode Hernandez MD        Consult Start Time: 05/23/2022 20:41 CDT  Consult End Time: 05/23/2022 21:35 CDT        Subjective:     History of Present Illness:  Per FNP today 1740: "Patient is prescribed Xanax prn, and Wellbutrin.  She had not been taking Wellbutrin consistent but over the last few weeks she has been taking the Wellbutrin as prescribed.  She was crying on the phone stating she was having weird thoughts and hearing voices which are telling her to "do bad things."  She stated her boyfriend was with her.  She stated she did not want to harm herself and did not have a plan.  She said she was having increased anxiety related to this.  She took a Xanax which was not relieving her symptoms.  I informed the patient to have her boyfriend take her to the ER for evaluation and to discontinue the Wellbutrin."    Per ED MD: "  Chief Complaint   Patient presents with    Psychiatric Evaluation       Patient has a history of panic attacks. Denies any triggers. States she was started on " Patient presents today for IV abx treatment.  Called earlier in the day to cancel due to not feeling well.  Patient's wife (Varsha ) states he is feeling weak and disoriented.  Later called back to move all IV ABX treatment to a later time due to difficulties getting upand moving in the morning. Per patient he has a call in to doctor to discuss these issues.   "Wellbutrin over a month ago but has not taken consistently until 2.5 weeks ago. Having SI thoughts while driving home today from work which she has never had before.  Patient crying in triage.       26-year-old female presents emergency department complaining of suicidal ideation.  Patient notes significant history of anxiety and depression.  States she started taking Wellbutrin regularly about 2 or 3 weeks ago.  States over the past few days she has had increasing incidence of suicidal ideation.  Notes intrusive thoughts about suicide.  This worsened today.  For example, patient states on her way home she was driving and had thoughts of just driving into oncoming traffic.  Has not acted on these symptoms.  Denies any somatic complaints.  No other symptoms reported at this time. "     Pt is a 27 y/o female with past psych h/o depression and anxiety BIB self as above. Chart reviewed, UDS + benzos. Pt says she has struggled with depression on and off since high school, worse last 3 years. Boyfriend was recently laid off and has been worried about him, has always been a worrier herself. + ruminating, catastrophizing, panic attacks. Sleeps 5-6 hrs/night, tired, + wt gain. Began having SI today while driving home from work. 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 H/o prior SA in high school. Denied intent or plan. Denied chace ROS. Denied SI/HI/AVH currently. Says she has tried lots of different meds, usually for 2-3 months, can't recall which worked better than others. Feels parents and boyfriend are supportive, are aware she is in ED. Amenable to contacting father for collateral.    REVIEW OF SYSTEMS  A comprehensive review of systems (i.e. Constitutional, Eyes, ENT/Mouth, Cardiovascular, Respiratory, Gastrointestinal, Genitourinary, Musculoskeletal, Skin, Neurologic, Endocrine, Heme/Lymph, and Allergy/Immune) was negative.    Collateral Dad Allen Smith  Discussed pt's sxs w permission, is aware and contracts for " "safety; says pt's mother and boyfriend can be with pt until SI resolves. Discussed recommendation for IOP/CBT. Will help pt tomorrow make plan for outpatient follow up. Will bring pt back if sxs worsen or fail to improve.       Psychiatric History:   Previous Psychiatric Hospitalizations: No   Previous Medication Trials: Yes Trazodone, Xanax, Zoloft, Seroquel, Buspar, Klonopin, Vistaril  Previous Suicide Attempts: no   History of Violence: no  History of Depression: yes  History of Stephanie: no  History of Auditory/Visual Hallucination no  History of Delusions: no  Outpatient psychiatrist (current & past): No    Substance Abuse History:  Tobacco:No  Alcohol: Yes rare  Illicit Substances:No  Detox/Rehab: No    Legal History: Past charges/incarcerations: No     Family Psychiatric History: mother depression (takes Klonopin), no suicide      Social History:  Developmental/Childhood:Achieved all developmental milestones timely  *Education:some college, on and off offline courses  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  Relationship Status/Sexual Orientation: boyfriend  Children: 0  Housing Status: with boyfriend    history:  NO  Access to gun: NO  Rastafari:not Evangelical  Recreational activities:Time with family, time w cats    Psychiatric Mental Status Exam:  Arousal: alert  Sensorium/Orientation: oriented to person, place, situation, day of week, month of year, year  Behavior/Cooperation: friendly and cooperative, eye contact normal   Speech: normal tone, normal rate, normal pitch, normal volume  Language: grossly intact  Mood: " scared "   Affect: anxious  Thought Process: normal and logical  Thought Content:   Auditory hallucinations: NO  Visual hallucinations: NO  Paranoia: NO  Delusions:  NO  Suicidal ideation: NO  Homicidal ideation: NO  Attention/Concentration:  spelled "WORLD" forwards and backwards  Memory:    Recent:  Intact   Remote: Intact   3/3 immediate, 3/3 at 5 min  Fund of " Knowledge: Aware of current events   Abstract reasoning: similarities were abstract  Insight: intact  Judgment: behavior is adequate to circumstances      Past Medical History:   Past Medical History:   Diagnosis Date    Anxiety     Depression     Endometriosis     IC (interstitial cystitis)       Laboratory Data:   Labs Reviewed   COMPREHENSIVE METABOLIC PANEL - Abnormal; Notable for the following components:       Result Value    BUN 5 (*)     All other components within normal limits   URINALYSIS - Abnormal; Notable for the following components:    Color, UA Colorless (*)     All other components within normal limits    Narrative:     Specimen Source->Urine   CBC W/ AUTO DIFFERENTIAL   DRUG SCREEN PANEL, URINE EMERGENCY   ALCOHOL,MEDICAL (ETHANOL)   ACETAMINOPHEN LEVEL   POCT URINE PREGNANCY       Neurological History:  Seizures: No  Head trauma: No    Allergies:   Review of patient's allergies indicates:   Allergen Reactions    Betadine [povidone-iodine]     Iodine and iodide containing products     Latex, natural rubber Rash    Scrub chlorhexidine gluconate [chlorhexidine gluconate] Rash     chrolaprep causes a reaction to the patient skin        Medications in ER: Medications - No data to display    Medications at home:   Medication List with Changes/Refills   Current Medications    ALBUTEROL (PROVENTIL/VENTOLIN HFA) 90 MCG/ACTUATION INHALER    Inhale 1-2 puffs into the lungs every 6 (six) hours as needed for Wheezing. Rescue    ALPRAZOLAM (XANAX) 0.5 MG TABLET    Take 1 tablet (0.5 mg total) by mouth 2 (two) times a day as needed for anxiety    BENZONATATE (TESSALON) 100 MG CAPSULE    Take 1 capsule (100 mg total) by mouth 3 (three) times daily as needed for Cough.    BUPROPION (WELLBUTRIN XL) 150 MG TB24 TABLET    Take 1 tablet (150 mg total) by mouth once daily.    DICLOFENAC (VOLTAREN) 75 MG EC TABLET    TAKE 1 TABLET(75 MG) BY MOUTH TWICE DAILY    DOXYCYCLINE (VIBRAMYCIN) 100 MG CAP    Take 1  Pubic ramus fracture Pubic ramus fracture capsule (100 mg total) by mouth 2 (two) times daily.    LEVOCETIRIZINE (XYZAL) 5 MG TABLET    Take 1 tablet (5 mg total) by mouth every evening.    NORETHINDRONE-E.ESTRADIOL-IRON (TAYTULLA/GEMMILY) 1 MG-20 MCG (24)/75 MG (4) CAP    Take 1 capsule by mouth once daily.    PROMETHAZINE (PHENERGAN) 25 MG TABLET    Take 1 tablet (25 mg total) by mouth every 6 (six) hours as needed for Nausea.    TRAZODONE (DESYREL) 100 MG TABLET    Take 1 tablet (100 mg total) by mouth every evening.         Assessment - Diagnosis - Goals:     IMPRESSION:   MDD recurrent  JAIMEE  Panic attacks    RECOMMENDATIONS:     DISPOSITION: Once medically cleared;    Pt may be discharged home with next of kin with outpt psychiatric follow up, recommended IOP level of care. Resources provided & they were instructed to f/u within 1-2 weeks. Discussed safety concerns and precautions. Also informed pt to return to ED for any worsening of psychiatric symptoms or any SI/HI/AVH.    PSYCHIATRIC MEDICATIONS  · After discussing the risks, benefits, alternatives vs no treatment, patient is agreeable and desiring a trial of Lexapro 10 mg daily, instructed to discontinue Wellbutrin.    LEGAL   Rescind PEC because pt is no longer in any imminent danger of hurting self or others and not gravely disabled.      OTHER   Recommended CBT, psychotherapy   Recommend check TSH, vit D   Patient was instructed to call 911 and return to the nearest ED if they begin feeling suicidal, homicidal, or gravely disabled due to a mental illness      Total time including chart review, time with patient, obtaining collateral info[if necessary/possible]: 50        More than 50% of the time was spent counseling/coordinating care    Consulting clinician was informed of the encounter and consult note.    Consultation ended: 5/23/2022 at 9:35 PM      Skye Moy MD   Psychiatry  [facility]

## 2024-04-23 ENCOUNTER — OFFICE VISIT (OUTPATIENT)
Dept: URGENT CARE | Facility: CLINIC | Age: 29
End: 2024-04-23
Payer: COMMERCIAL

## 2024-04-23 VITALS
HEIGHT: 60 IN | TEMPERATURE: 98 F | DIASTOLIC BLOOD PRESSURE: 81 MMHG | WEIGHT: 180 LBS | HEART RATE: 71 BPM | OXYGEN SATURATION: 99 % | BODY MASS INDEX: 35.34 KG/M2 | RESPIRATION RATE: 18 BRPM | SYSTOLIC BLOOD PRESSURE: 117 MMHG

## 2024-04-23 DIAGNOSIS — R21 RASH IN ADULT: ICD-10-CM

## 2024-04-23 DIAGNOSIS — L25.9 CONTACT DERMATITIS, UNSPECIFIED CONTACT DERMATITIS TYPE, UNSPECIFIED TRIGGER: Primary | ICD-10-CM

## 2024-04-23 PROCEDURE — 99213 OFFICE O/P EST LOW 20 MIN: CPT | Mod: S$GLB,,,

## 2024-04-23 RX ORDER — CEPHALEXIN 500 MG/1
500 CAPSULE ORAL 2 TIMES DAILY
COMMUNITY
Start: 2024-04-18 | End: 2024-05-12

## 2024-04-23 RX ORDER — HYDROCORTISONE 25 MG/G
CREAM TOPICAL 2 TIMES DAILY
Qty: 20 G | Refills: 0 | Status: SHIPPED | OUTPATIENT
Start: 2024-04-23 | End: 2024-05-12

## 2024-04-23 RX ORDER — LEVOCETIRIZINE DIHYDROCHLORIDE 5 MG/1
5 TABLET, FILM COATED ORAL NIGHTLY
Qty: 30 TABLET | Refills: 11 | Status: SHIPPED | OUTPATIENT
Start: 2024-04-23 | End: 2024-05-12

## 2024-04-23 NOTE — PROGRESS NOTES
Subjective:      Patient ID: Maury Smith is a 28 y.o. female.    Vitals:  height is 5' (1.524 m) and weight is 81.6 kg (180 lb). Her oral temperature is 97.9 °F (36.6 °C). Her blood pressure is 117/81 and her pulse is 71. Her respiration is 18 and oxygen saturation is 99%.     Chief Complaint: Rash    Pt reports rash to bilateral eyelids beginning on Monday. Stated area is irritated, sensitivity, and itching. No new detergents, soaps.     Rash  This is a new problem. Episode onset: 2 days. The problem has been gradually worsening since onset. The affected locations include the left eye and right eye. The rash is characterized by redness, itchiness and burning. It is unknown if there was an exposure to a precipitant. Pertinent negatives include no congestion, cough, diarrhea, fatigue, fever, sore throat or vomiting. Past treatments include nothing.       Constitution: Negative for fatigue and fever.   HENT:  Negative for congestion and sore throat.    Respiratory:  Negative for cough.    Gastrointestinal:  Negative for vomiting and diarrhea.   Skin:  Positive for rash.      Objective:     Physical Exam   Constitutional:  Non-toxic appearance. She does not appear ill. No distress.   HENT:   Head: Normocephalic and atraumatic.   Eyes: Conjunctivae are normal. Right eye exhibits no discharge. Left eye exhibits no discharge.          Comments: Bilateral upper eyelids have erythema, small papules and irritation. No vesicles noted. Conjunctiva shows no erythema.    Abdominal: Normal appearance.   Neurological: She is alert.   Skin: Skin is not diaphoretic.   Nursing note and vitals reviewed.      Assessment:     1. Contact dermatitis, unspecified contact dermatitis type, unspecified trigger    2. Rash in adult        Plan:       Contact dermatitis, unspecified contact dermatitis type, unspecified trigger  -     hydrocortisone 2.5 % cream; Apply topically 2 (two) times daily.  Dispense: 20 g; Refill: 0  -      levocetirizine (XYZAL) 5 MG tablet; Take 1 tablet (5 mg total) by mouth every evening.  Dispense: 30 tablet; Refill: 11    Rash in adult  -     Ambulatory referral/consult to Dermatology        Follow up with [facility] at [phone]  Cool compresses to area. Do not use topical steroid cream for longer than 7 days at a time.     Patient Instructions   1.  Take all medications as directed. If you have been prescribed antibiotics, make sure to complete them.   2.  Rest and keep yourself/patient well hydrated. For adults, it is recommended to drink at least 8-10 glasses of water daily.   3.  For patients above 6 months of age who are not allergic to and are not on anticoagulants, you can alternate Tylenol and Motrin every 4-6 hours for fever above 100.4F and/or pain.  For patients less than 6 months of age, allergic to or intolerant to NSAIDS, have gastritis, gastric ulcers, or history of GI bleeds, are pregnant, or are on anticoagulant therapy, you can take Tylenol every 4 hours as needed for fever above 100.4F and/or pain.   4. You should schedule a follow-up appointment with your Primary Care Provider/Pediatrician for recheck in 2-3 days or as directed at this visit.   5.  If your condition fails to improve in a timely manner, you should receive another evaluation by your Primary Care Provider/Pediatrician to discuss your concerns or return to urgent care for a recheck.  If your condition worsens at any time, you should report immediately to your nearest Emergency Department for further evaluation. **You must understand that you have received Urgent Care treatment only and that you may be released before all of your medical problems are known or treated. You, the patient, are responsible to arrange for follow-up care as instructed.

## 2024-04-24 ENCOUNTER — TELEPHONE (OUTPATIENT)
Dept: URGENT CARE | Facility: CLINIC | Age: 29
End: 2024-04-24
Payer: COMMERCIAL

## 2024-04-24 NOTE — TELEPHONE ENCOUNTER
Pt states she does not need the referral for dermatology through [facility] due to her making an appointment elsewhere.

## 2024-04-24 NOTE — PATIENT INSTRUCTIONS
You must understand that you have received treatment at an Urgent Care facility only, and that you may be  released before all of your medical problems are known or treated. Urgent Care facilities are not equipped to  handle life threatening emergencies. It is recommended that you seek care at an Emergency Department for  further evaluation of worsening or concerning symptoms, or possibly life threatening conditions as  discussed.    Follow up with [facility] at [phone]  Cool compresses to area. Do not use topical steroid cream for longer than 7 days at a time.     Patient Instructions   1.  Take all medications as directed. If you have been prescribed antibiotics, make sure to complete them.   2.  Rest and keep yourself/patient well hydrated. For adults, it is recommended to drink at least 8-10 glasses of water daily.   3.  For patients above 6 months of age who are not allergic to and are not on anticoagulants, you can alternate Tylenol and Motrin every 4-6 hours for fever above 100.4F and/or pain.  For patients less than 6 months of age, allergic to or intolerant to NSAIDS, have gastritis, gastric ulcers, or history of GI bleeds, are pregnant, or are on anticoagulant therapy, you can take Tylenol every 4 hours as needed for fever above 100.4F and/or pain.   4. You should schedule a follow-up appointment with your Primary Care Provider/Pediatrician for recheck in 2-3 days or as directed at this visit.   5.  If your condition fails to improve in a timely manner, you should receive another evaluation by your Primary Care Provider/Pediatrician to discuss your concerns or return to urgent care for a recheck.  If your condition worsens at any time, you should report immediately to your nearest Emergency Department for further evaluation. **You must understand that you have received Urgent Care treatment only and that you may be released before all of your medical problems are known or treated. You, the  patient, are responsible to arrange for follow-up care as instructed.

## 2024-05-12 ENCOUNTER — OFFICE VISIT (OUTPATIENT)
Dept: URGENT CARE | Facility: CLINIC | Age: 29
End: 2024-05-12
Payer: COMMERCIAL

## 2024-05-12 VITALS
SYSTOLIC BLOOD PRESSURE: 138 MMHG | WEIGHT: 180 LBS | BODY MASS INDEX: 35.34 KG/M2 | RESPIRATION RATE: 16 BRPM | HEART RATE: 101 BPM | TEMPERATURE: 99 F | DIASTOLIC BLOOD PRESSURE: 86 MMHG | OXYGEN SATURATION: 99 % | HEIGHT: 60 IN

## 2024-05-12 DIAGNOSIS — B37.0 OROPHARYNGEAL CANDIDIASIS: Primary | ICD-10-CM

## 2024-05-12 DIAGNOSIS — Z11.59 SCREENING FOR VIRAL DISEASE: ICD-10-CM

## 2024-05-12 LAB
CTP QC/QA: YES
MOLECULAR STREP A: NEGATIVE
POC MOLECULAR INFLUENZA A AGN: NEGATIVE
POC MOLECULAR INFLUENZA B AGN: NEGATIVE
SARS-COV-2 AG RESP QL IA.RAPID: NEGATIVE

## 2024-05-12 PROCEDURE — 99213 OFFICE O/P EST LOW 20 MIN: CPT | Mod: S$GLB,,,

## 2024-05-12 PROCEDURE — 87502 INFLUENZA DNA AMP PROBE: CPT | Mod: QW,S$GLB,,

## 2024-05-12 PROCEDURE — 87651 STREP A DNA AMP PROBE: CPT | Mod: QW,S$GLB,,

## 2024-05-12 PROCEDURE — 87811 SARS-COV-2 COVID19 W/OPTIC: CPT | Mod: QW,S$GLB,,

## 2024-05-12 RX ORDER — LABETALOL 100 MG/1
100 TABLET, FILM COATED ORAL ONCE
COMMUNITY

## 2024-05-12 RX ORDER — NYSTATIN 100000 [USP'U]/ML
4 SUSPENSION ORAL 4 TIMES DAILY
Qty: 160 ML | Refills: 0 | Status: SHIPPED | OUTPATIENT
Start: 2024-05-12 | End: 2024-05-22

## 2024-05-12 NOTE — PROGRESS NOTES
Subjective:      Patient ID: Maury Smith is a 28 y.o. female.    Vitals:  height is 5' (1.524 m) and weight is 81.6 kg (180 lb). Her temperature is 98.9 °F (37.2 °C). Her blood pressure is 138/86 and her pulse is 101. Her respiration is 16 and oxygen saturation is 99%.     Chief Complaint: Sinus Problem    28 year old female presents today with needing screening for COVID and Flu per her doctor from being seen on 05/10/2024. Fever at home was 104.1, cough, HA, body aches, chills, sweats., sore throat. Symptoms started a few days ago. Treatments at home includes Tylenol and Advil with mild relief for fever. COVID home test was negative. Pt reports being sexually active with a new partner, denies concerns for STIs.     Sinus Problem  Episode onset: 05/09/2024. The problem is unchanged. The maximum temperature recorded prior to her arrival was 103 - 104 F. Her pain is at a severity of 8/10. Associated symptoms include chills, coughing, diaphoresis, headaches and a sore throat. Pertinent negatives include no congestion, ear pain, hoarse voice, neck pain, shortness of breath, sinus pressure, sneezing or swollen glands.       Constitution: Positive for chills and sweating.   HENT:  Positive for sore throat. Negative for ear pain, congestion and sinus pressure.    Neck: Negative for neck pain.   Respiratory:  Positive for cough. Negative for shortness of breath.    Allergic/Immunologic: Negative for sneezing.   Neurological:  Positive for headaches.      Objective:     Physical Exam   Constitutional:  Non-toxic appearance. She does not appear ill. No distress.   HENT:   Head: Normocephalic and atraumatic.   Ears:   Right Ear: Tympanic membrane, external ear and ear canal normal.   Left Ear: Tympanic membrane, external ear and ear canal normal.   Nose: No rhinorrhea or congestion.   Mouth/Throat: Uvula is midline. Mucous membranes are moist. Posterior oropharyngeal erythema present. No posterior oropharyngeal edema.  Oropharynx is clear.   Tongue has whitish colored plaques, can be scraped with tongue depressor.       Comments: Tongue has whitish colored plaques, can be scraped with tongue depressor.   Eyes: Conjunctivae are normal.   Neck: Neck supple. No neck rigidity present.   Cardiovascular: Normal rate and regular rhythm.   Pulmonary/Chest: Effort normal. No respiratory distress. She has no wheezes. She has no rhonchi.   Abdominal: Normal appearance.   Neurological: no focal deficit. She is alert.   Skin: Skin is warm, dry and not diaphoretic.   Psychiatric: Her behavior is normal. Judgment and thought content normal.   Nursing note and vitals reviewed.    Results for orders placed or performed in visit on 05/12/24   SARS Coronavirus 2 Antigen, POCT Manual Read   Result Value Ref Range    SARS Coronavirus 2 Antigen Negative Negative     Acceptable Yes    POCT Influenza A/B MOLECULAR   Result Value Ref Range    POC Molecular Influenza A Ag Negative Negative    POC Molecular Influenza B Ag Negative Negative     Acceptable Yes    POCT Strep A, Molecular   Result Value Ref Range    Molecular Strep A, POC Negative Negative     Acceptable Yes        Assessment:     1. Oropharyngeal candidiasis    2. Screening for viral disease        Plan:       Oropharyngeal candidiasis  -     nystatin (MYCOSTATIN) 100,000 unit/mL suspension; Take 4 mLs (400,000 Units total) by mouth 4 (four) times daily. for 10 days  Dispense: 160 mL; Refill: 0    Screening for viral disease  -     SARS Coronavirus 2 Antigen, POCT Manual Read  -     POCT Influenza A/B MOLECULAR  -     POCT Strep A, Molecular        Covid, flu ,strep are negative. Discussed results with patient. VS stable,a febrile in clinic. Pt declined STI testing today in clinic.       Use swish and swallow solution up to 4 times daily. Throw away old toothbrush. Monitor fever. Alternate ibuprofen and tylenol every 4-6 hours for sore throat/fever.      Patient Instructions   1.  Take all medications as directed. If you have been prescribed antibiotics, make sure to complete them.   2.  Rest and keep yourself/patient well hydrated. For adults, it is recommended to drink at least 8-10 glasses of water daily.   3.  For patients above 6 months of age who are not allergic to and are not on anticoagulants, you can alternate Tylenol and Motrin every 4-6 hours for fever above 100.4F and/or pain.  For patients less than 6 months of age, allergic to or intolerant to NSAIDS, have gastritis, gastric ulcers, or history of GI bleeds, are pregnant, or are on anticoagulant therapy, you can take Tylenol every 4 hours as needed for fever above 100.4F and/or pain.   4. You should schedule a follow-up appointment with your Primary Care Provider/Pediatrician for recheck in 2-3 days or as directed at this visit.   5.  If your condition fails to improve in a timely manner, you should receive another evaluation by your Primary Care Provider/Pediatrician to discuss your concerns or return to urgent care for a recheck.  If your condition worsens at any time, you should report immediately to your nearest Emergency Department for further evaluation. **You must understand that you have received Urgent Care treatment only and that you may be released before all of your medical problems are known or treated. You, the patient, are responsible to arrange for follow-up care as instructed.

## 2024-05-12 NOTE — PATIENT INSTRUCTIONS
You must understand that you have received treatment at an Urgent Care facility only, and that you may be  released before all of your medical problems are known or treated. Urgent Care facilities are not equipped to  handle life threatening emergencies. It is recommended that you seek care at an Emergency Department for  further evaluation of worsening or concerning symptoms, or possibly life threatening conditions as  discussed.    Use swish and swallow solution up to 4 times daily. Throw away old toothbrush. Monitor fever. Alternate ibuprofen and tylenol every 4-6 hours for sore throat/fever.     Patient Instructions   1.  Take all medications as directed. If you have been prescribed antibiotics, make sure to complete them.   2.  Rest and keep yourself/patient well hydrated. For adults, it is recommended to drink at least 8-10 glasses of water daily.   3.  For patients above 6 months of age who are not allergic to and are not on anticoagulants, you can alternate Tylenol and Motrin every 4-6 hours for fever above 100.4F and/or pain.  For patients less than 6 months of age, allergic to or intolerant to NSAIDS, have gastritis, gastric ulcers, or history of GI bleeds, are pregnant, or are on anticoagulant therapy, you can take Tylenol every 4 hours as needed for fever above 100.4F and/or pain.   4. You should schedule a follow-up appointment with your Primary Care Provider/Pediatrician for recheck in 2-3 days or as directed at this visit.   5.  If your condition fails to improve in a timely manner, you should receive another evaluation by your Primary Care Provider/Pediatrician to discuss your concerns or return to urgent care for a recheck.  If your condition worsens at any time, you should report immediately to your nearest Emergency Department for further evaluation. **You must understand that you have received Urgent Care treatment only and that you may be released before all of your medical problems are known or  treated. You, the patient, are responsible to arrange for follow-up care as instructed.

## 2024-06-19 ENCOUNTER — OFFICE VISIT (OUTPATIENT)
Dept: PSYCHIATRY | Facility: CLINIC | Age: 29
End: 2024-06-19
Payer: COMMERCIAL

## 2024-06-19 VITALS
HEART RATE: 89 BPM | HEIGHT: 60 IN | WEIGHT: 177.81 LBS | DIASTOLIC BLOOD PRESSURE: 70 MMHG | OXYGEN SATURATION: 98 % | BODY MASS INDEX: 34.91 KG/M2 | SYSTOLIC BLOOD PRESSURE: 104 MMHG

## 2024-06-19 DIAGNOSIS — F33.2 SEVERE EPISODE OF RECURRENT MAJOR DEPRESSIVE DISORDER, WITHOUT PSYCHOTIC FEATURES: Primary | ICD-10-CM

## 2024-06-19 DIAGNOSIS — F41.1 GAD (GENERALIZED ANXIETY DISORDER): ICD-10-CM

## 2024-06-19 DIAGNOSIS — F32.A DEPRESSION, UNSPECIFIED DEPRESSION TYPE: ICD-10-CM

## 2024-06-19 DIAGNOSIS — F41.0 PANIC ATTACKS: ICD-10-CM

## 2024-06-19 PROCEDURE — 90833 PSYTX W PT W E/M 30 MIN: CPT | Mod: S$GLB,,, | Performed by: STUDENT IN AN ORGANIZED HEALTH CARE EDUCATION/TRAINING PROGRAM

## 2024-06-19 PROCEDURE — 1160F RVW MEDS BY RX/DR IN RCRD: CPT | Mod: CPTII,S$GLB,, | Performed by: STUDENT IN AN ORGANIZED HEALTH CARE EDUCATION/TRAINING PROGRAM

## 2024-06-19 PROCEDURE — 3074F SYST BP LT 130 MM HG: CPT | Mod: CPTII,S$GLB,, | Performed by: STUDENT IN AN ORGANIZED HEALTH CARE EDUCATION/TRAINING PROGRAM

## 2024-06-19 PROCEDURE — 99214 OFFICE O/P EST MOD 30 MIN: CPT | Mod: S$GLB,,, | Performed by: STUDENT IN AN ORGANIZED HEALTH CARE EDUCATION/TRAINING PROGRAM

## 2024-06-19 PROCEDURE — 3078F DIAST BP <80 MM HG: CPT | Mod: CPTII,S$GLB,, | Performed by: STUDENT IN AN ORGANIZED HEALTH CARE EDUCATION/TRAINING PROGRAM

## 2024-06-19 PROCEDURE — 99999 PR PBB SHADOW E&M-EST. PATIENT-LVL III: CPT | Mod: PBBFAC,,, | Performed by: STUDENT IN AN ORGANIZED HEALTH CARE EDUCATION/TRAINING PROGRAM

## 2024-06-19 PROCEDURE — 3008F BODY MASS INDEX DOCD: CPT | Mod: CPTII,S$GLB,, | Performed by: STUDENT IN AN ORGANIZED HEALTH CARE EDUCATION/TRAINING PROGRAM

## 2024-06-19 PROCEDURE — 1159F MED LIST DOCD IN RCRD: CPT | Mod: CPTII,S$GLB,, | Performed by: STUDENT IN AN ORGANIZED HEALTH CARE EDUCATION/TRAINING PROGRAM

## 2024-06-19 RX ORDER — LAMOTRIGINE 200 MG/1
200 TABLET ORAL DAILY
Qty: 30 TABLET | Refills: 2 | Status: SHIPPED | OUTPATIENT
Start: 2024-06-19 | End: 2025-06-19

## 2024-06-19 RX ORDER — FLUOXETINE HYDROCHLORIDE 20 MG/1
20 CAPSULE ORAL DAILY
Qty: 30 CAPSULE | Refills: 2 | Status: SHIPPED | OUTPATIENT
Start: 2024-06-19 | End: 2025-06-19

## 2024-06-19 RX ORDER — CLONAZEPAM 2 MG/1
2 TABLET ORAL 3 TIMES DAILY
Qty: 90 TABLET | Refills: 1 | Status: SHIPPED | OUTPATIENT
Start: 2024-06-19 | End: 2025-06-19

## 2024-06-19 NOTE — PROGRESS NOTES
"              2024  12:05 PM  Maury Smith  [id number]    Outpatient Psychiatry Follow-Up Visit (MD/NP)    2024    Clinical Status of Patient:  Outpatient (Ambulatory)      Chief Complaint:  Maury Smith is a 28 y.o. female who presents today for follow-up of depression and anxiety.  Met with patient.          Interval History and Content of Current Session:  Interim Events/Subjective Report/Content of Current Session:   MDD, recurrent, severe  JAIMEE  Panic attacks  PTSD  OCD (compulsions, buttons on phones)  Bulimia         Patient reports stopped all medications due to pregnancy last year.  Her baby is doing well. She states her boyfriend left her because he did not want to be a parent, "he's not in our life at all." She states her anxiety was very high during pregnancy, "I was in therapy constantly," reports was going to therapy 2-3 days per week and calling therapist in between appointments. Reports her mother was diagnosed with cancer, "it's curable, she's doing treatments, it's been very difficult for me and my dad and sister." Reports stress from "being a single parent."  She resumed her medications post partum under care of OB and PCP but does not feel they are working, "I'm on edge ." Pt currently taking prozac 10 mg PO qd, lamictal 200 mg PO qd, klonopin 2 mg PO TID.    She states her mood is depressed at times, but states "I'm happy, but it's in the back of my mind, I'm happy with me and my son, and my mom is going to be okay, I have a lot of support, I have a lot of good things, but sometimes it just sucks." Reports she feels sad daily, "when I'm home alone, I realize we are alone, just me and my son, I'm like oh crap, I feel like crying, and guilt that my child doesn't have his dad in his life... his father was pushing me for an ."      Psychiatric Review Of Systems - Is patient experiencing or having changes in:  Sleep: yes, checking on baby often during the night, "I wake up " "every few hours to make sure he's breathing"  appetite: "Low.. I'm anxious constantly.. physically sick to my stomach"  energy/anergy: "always feel wired.. good for work, but my brain goes in 15 different directions"  interest/pleasure/anhedonia: variable, "I like work, and my child" but uninterested in her usual interests  anxiety/panic: yes  Guilty/hopelessness/worthlessness: yes  concentration: yes  S.I.B.s/risky behavior: no  Irritability: denies  Substance abuse: denies  Racing thoughts: no  Impulsive behaviors: no  Paranoia: no  AVH: no  PTSD: less          Psychotherapy:  Target symptoms: depression, anxiety   Why chosen therapy is appropriate versus another modality: relevant to diagnosis  Outcome monitoring methods: self-report  Therapeutic intervention type: supportive psychotherapy  Topics discussed/themes: parenting issues, illness/death of a loved one, building skills sets for symptom management, symptom recognition  The patient's response to the intervention is accepting. The patient's progress toward treatment goals is good.   Duration of intervention: 17 minutes.            Medical Review of Systems   Review of Systems   Constitutional:  Negative for chills and fever.   HENT:  Negative for hearing loss.    Eyes:  Negative for blurred vision and double vision.   Respiratory:  Negative for shortness of breath.    Cardiovascular:  Negative for chest pain and palpitations.   Gastrointestinal:  Negative for constipation, diarrhea, nausea and vomiting.   Genitourinary:  Negative for dysuria.   Musculoskeletal:  Negative for back pain and neck pain.   Skin:  Negative for rash.   Neurological:  Negative for dizziness and headaches.   Endo/Heme/Allergies:  Negative for environmental allergies.         Past Medical, Family and Social History: The patient's past medical, family and social history have been reviewed and updated as appropriate within the electronic medical record - see encounter " notes.        Social History     Socioeconomic History    Marital status: Single    Number of children: 0   Tobacco Use    Smoking status: Never     Passive exposure: Never    Smokeless tobacco: Never   Substance and Sexual Activity    Alcohol use: Not Currently     Comment: she has cut back since her fall     Drug use: Never    Sexual activity: Yes     Partners: Male     Social Determinants of Health     Stress: Stress Concern Present (12/1/2020)    [employer] - Occupational Stress Questionnaire     Feeling of Stress : Very much         Compliance: yes    Side effects: None    Risk Parameters:  Patient reports no suicidal ideation  Patient reports no homicidal ideation  Patient reports no self-injurious behavior  Patient reports no violent behavior        Exam (detailed: at least 9 elements; comprehensive: all 15 elements)     Vitals:    06/19/24 1400   BP: 104/70   Pulse: 89         Wt Readings from Last 4 Encounters:   06/19/24 80.6 kg (177 lb 12.8 oz)   05/29/24 82.1 kg (181 lb)   05/12/24 81.6 kg (180 lb)   04/23/24 81.6 kg (180 lb)         CONSTITUTIONAL  General Appearance: unremarkable, age appropriate    MUSCULOSKELETAL  Muscle Strength and Tone:no tremor, no tic  Abnormal Involuntary Movements: No  Gait and Station: non-ataxic    PSYCHIATRIC   Level of Consciousness: awake and alert   Orientation: person, place and situation  Grooming: Casually dressed and Well groomed  Psychomotor Behavior: normal, cooperative  Speech: normal tone, normal rate, normal pitch, normal volume  Language: grossly intact  Mood: anxious  Affect: Consistent with mood  Thought Process: linear, logical  Associations: intact   Thought Content: DENIES suicidal ideation and DENIES homicidal ideation  Perceptions: denies hallucinations  Memory: Able to recall past events, Remote intact and Recent intact  Attention:Attends to interview without distraction  Fund of Knowledge: Aware of current events and  Vocabulary appropriate   Estimate if Intelligence:  Average based on work/education history, vocabulary and mental status exam  Insight: has awareness of illness  Judgment: behavior is adequate to circumstances        Assessment and Diagnosis   Status/Progress: Based on the examination today, the patient's problem(s) is/are treatment resistant.  New problems have not been presented today.   Co-morbidities are complicating management of the primary condition.          Assessment/Impression:       MDD, recurrent, severe  JAIMEE  Panic attacks  PTSD  OCD  Bulimia            Plan:           MDD, recurrent, severe  - continue lamictal 200 mg PO qd  - increase prozac 10 to 20 mg PO qd  - continue psychotherapy with Solange Ayo weekly  - pt counseled     JAIMEE  - increase prozac 10 to 20 mg PO qd  - continue psychotherapy with Solange Ayo weekly   - pt counseled       OCD   - increase prozac 10 to 20 mg PO qd  - continue psychotherapy with Solange Ayo weekly  - pt counseled      Panic attacks  - continue klonopin 2 mg PO TID for now with plan to decrease, continued to encourage tapering down, recommended decreasing to 2/1/2 mg PO TID x 1 month then decreasing to 2 mg PO BID, pt agrees  - continue psychotherapy with Solange Ayo weekly  - pt counseled        PTSD  -  increase prozac 10 to 20 mg PO qd  - continue psychotherapy with Solange Ayo weekly  - pt counseled        Bulimia Nervosa  - increase prozac 10 to 20 mg PO qd  - continue psychotherapy with Solange Ayo weekly  - pt counseled           - Instructed patient to keep all scheduled appointments, take medications as prescribed and abstain from substance abuse. Instructed to call 911 or present to ER for emergency including SI or HI.    - Discussed diagnosis, risks and benefits of proposed treatment above vs alternative treatments vs no treatment, and potential side effects of these treatments. Discussed the inherent unpredictability of treatment. The  patient expresses understanding of the above and displays the capacity to agree with this treatment given said understanding. Patient also agrees that, currently, the benefits outweigh the risks and would like to pursue this treatment at this time.     - Any medications being used off-label were discussed with the patient inclusive of the evidence base for the use of the medications and consent was obtained for the off-label use of the medication.         Axel Villafana III, MD    Return to Clinic: 4 weeks

## 2024-07-18 DIAGNOSIS — F32.A DEPRESSION, UNSPECIFIED DEPRESSION TYPE: ICD-10-CM

## 2024-07-18 RX ORDER — CLONAZEPAM 2 MG/1
2 TABLET ORAL 3 TIMES DAILY
Qty: 90 TABLET | Refills: 1 | Status: SHIPPED | OUTPATIENT
Start: 2024-07-18 | End: 2025-07-18

## 2024-07-18 NOTE — TELEPHONE ENCOUNTER
----- Message from Saima Pina sent at 2024 10:20 AM CDT -----  Contact: PATIENT  Maury Smith  MRN: [id number]  : 1995  PCP: Nicolas Kenny  Home Phone      [phone]  Work Phone      Not on file.  Mobile          [phone]      MESSAGE: Patient is needing the prescription for the Clonazepam to be sent to [facility] for the [facility] on [facility] [address].          Phone: [phone]

## 2024-07-23 ENCOUNTER — TELEPHONE (OUTPATIENT)
Dept: PSYCHIATRY | Facility: CLINIC | Age: 29
End: 2024-07-23
Payer: COMMERCIAL

## 2024-07-23 NOTE — TELEPHONE ENCOUNTER
Attempted to contact patient twice, no answer, left a voicemail for patient to return call to clinic.

## 2024-07-23 NOTE — TELEPHONE ENCOUNTER
Patient called back, she was informed that our providers do not see each others patients. She was given other recommendations.

## 2024-07-23 NOTE — TELEPHONE ENCOUNTER
----- Message from Chelo Tovar MA sent at 2024  4:33 PM CDT -----  Contact: PATIENT    ----- Message -----  From: Saima Pina  Sent: 2024   9:15 AM CDT  To: Ledy Reyna Staff    Maury Smith  MRN: [id number]  : 1995  PCP: Nicolas Kenny  Home Phone      [phone]  Work Phone      Not on file.  Mobile          [phone]      MESSAGE: Patient currently sees Dr. Villafana but would like to see if she can make an appointment with one of the other psychiatrist.          Phone: [phone]